# Patient Record
Sex: FEMALE | Race: WHITE | Employment: UNEMPLOYED | URBAN - METROPOLITAN AREA
[De-identification: names, ages, dates, MRNs, and addresses within clinical notes are randomized per-mention and may not be internally consistent; named-entity substitution may affect disease eponyms.]

---

## 2017-02-15 NOTE — PERIOP NOTES
[facility]  Ambulatory Surgery Unit  Pre-operative Instructions    Surgery/Procedure Date  2/17            Tentative Arrival Time TBD      1. On the day of your surgery/procedure, please report to the Ambulatory Surgery Unit Registration Desk and sign in at your designated time. The Ambulatory Surgery Unit is located in [facility] on the [facility] side of the [facility] across from the [address] [place] [address]. Please have all of your health insurance cards and a photo ID. 2. You must have someone with you to drive you home, as you should not drive a car for 24 hours following anesthesia. Please make arrangements for a responsible adult friend or family member to stay with you for at least the first 24 hours after your surgery. 3. Do not have anything to eat or drink (including water, gum, mints, coffee, juice) after midnight   2/16. This may not apply to medications prescribed by your physician. (Please note below the special instructions with medications to take the morning of surgery, if applicable.)    4. We recommend you do not drink any alcoholic beverages for 24 hours before and after your surgery. 5. Stop all Aspirin, non-steroidal anti-inflammatory drugs (i.e. Advil, Aleve), vitamins, and supplements as directed by your surgeon's office. **If you are currently taking Plavix, Coumadin, or other blood-thinning agents, contact your surgeon for instructions. **    6. In an effort to help prevent surgical site infection, we ask that you shower with an anti-bacterial soap (i.e. Dial or Safeguard) for 3 days prior to and on the morning of surgery, using a fresh towel after each shower. (Please begin this process with fresh bed linens.) Do not apply any lotions, powders, or deodorants after the shower on the day of your procedure. If applicable, please do not shave the operative site for 48 hours prior to surgery. 7. Wear comfortable clothes. Wear glasses instead of contacts.  Do not bring any jewelry or money (other than copays or fees as instructed). Do not wear make-up, particularly mascara, the morning of your surgery. Do not wear nail polish, particularly if you are having foot /hand surgery. Wear your hair loose or down, no ponytails, buns, tomeka pins or clips. All body piercings must be removed. 8. You should understand that if you do not follow these instructions your surgery may be cancelled. If your physical condition changes (i.e. fever, cold or flu) please contact your surgeon as soon as possible. 9. It is important that you be on time. If a situation occurs where you may be late, or if you have any questions or problems, please call [phone].    10. Your surgery time may be subject to change. You will receive a phone call the day prior to surgery to confirm your arrival time. 11. Pediatric patients: please bring a change of clothes, diapers, bottle/sippy cup, pacifier, etc.      Special Instructions:    MEDICATIONS TO TAKE THE MORNING OF SURGERY WITH A SIP OF WATER: prilosec (tramadol and flonase if needed)      I understand a pre-operative phone call will be made to verify my surgery time. In the event that I am not available, I give permission for a message to be left on my answering service and/or with another person? yes    Reviewed by phone with pt.   Verbalized understanding   ___________________      ___________________      ________________  (Signature of Patient)          (Witness)                   (Date and Time)

## 2017-02-16 ENCOUNTER — ANESTHESIA EVENT (OUTPATIENT)
Dept: SURGERY | Age: 47
End: 2017-02-16
Payer: MEDICAID

## 2017-02-17 ENCOUNTER — HOSPITAL ENCOUNTER (OUTPATIENT)
Age: 47
Setting detail: OUTPATIENT SURGERY
Discharge: HOME | End: 2017-02-17
Attending: OTOLARYNGOLOGY | Admitting: OTOLARYNGOLOGY
Payer: MEDICAID

## 2017-02-17 ENCOUNTER — ANESTHESIA (OUTPATIENT)
Dept: SURGERY | Age: 47
End: 2017-02-17
Payer: MEDICAID

## 2017-02-17 VITALS
SYSTOLIC BLOOD PRESSURE: 108 MMHG | DIASTOLIC BLOOD PRESSURE: 64 MMHG | HEART RATE: 78 BPM | HEIGHT: 61 IN | RESPIRATION RATE: 16 BRPM | OXYGEN SATURATION: 99 % | WEIGHT: 203 LBS | BODY MASS INDEX: 38.33 KG/M2 | TEMPERATURE: 97.7 F

## 2017-02-17 LAB — HCG UR QL: NEGATIVE

## 2017-02-17 PROCEDURE — 74011250637 HC RX REV CODE- 250/637: Performed by: ANESTHESIOLOGY

## 2017-02-17 PROCEDURE — 77030008656 HC TU EAR GRMMT MEDT -B: Performed by: OTOLARYNGOLOGY

## 2017-02-17 PROCEDURE — 77030018836 HC SOL IRR NACL ICUM -A: Performed by: OTOLARYNGOLOGY

## 2017-02-17 PROCEDURE — 74011250637 HC RX REV CODE- 250/637: Performed by: OTOLARYNGOLOGY

## 2017-02-17 PROCEDURE — 74011250636 HC RX REV CODE- 250/636: Performed by: ANESTHESIOLOGY

## 2017-02-17 PROCEDURE — 76210000046 HC AMBSU PH II REC FIRST 0.5 HR: Performed by: OTOLARYNGOLOGY

## 2017-02-17 PROCEDURE — 74011000250 HC RX REV CODE- 250

## 2017-02-17 PROCEDURE — 74011250636 HC RX REV CODE- 250/636

## 2017-02-17 PROCEDURE — 76210000040 HC AMBSU PH I REC FIRST 0.5 HR: Performed by: OTOLARYNGOLOGY

## 2017-02-17 PROCEDURE — 77030020255 HC SOL INJ LR 1000ML BG: Performed by: OTOLARYNGOLOGY

## 2017-02-17 PROCEDURE — 76030000002 HC AMB SURG OR TIME FIRST 0.: Performed by: OTOLARYNGOLOGY

## 2017-02-17 PROCEDURE — 81025 URINE PREGNANCY TEST: CPT

## 2017-02-17 PROCEDURE — 76060000073 HC AMB SURG ANES FIRST 0.5 HR: Performed by: OTOLARYNGOLOGY

## 2017-02-17 PROCEDURE — 77030006671 HC BLD MYRIN BVR BD -A: Performed by: OTOLARYNGOLOGY

## 2017-02-17 DEVICE — VENT TUBE 1028145 5PK SHEEHY SILICONE
Type: IMPLANTABLE DEVICE | Site: EAR | Status: FUNCTIONAL
Brand: SHEEHY

## 2017-02-17 RX ORDER — MORPHINE SULFATE 10 MG/ML
2 INJECTION, SOLUTION INTRAMUSCULAR; INTRAVENOUS
Status: DISCONTINUED | OUTPATIENT
Start: 2017-02-17 | End: 2017-02-17 | Stop reason: HOSPADM

## 2017-02-17 RX ORDER — FENTANYL CITRATE 50 UG/ML
INJECTION, SOLUTION INTRAMUSCULAR; INTRAVENOUS AS NEEDED
Status: DISCONTINUED | OUTPATIENT
Start: 2017-02-17 | End: 2017-02-17 | Stop reason: HOSPADM

## 2017-02-17 RX ORDER — ONDANSETRON 2 MG/ML
INJECTION INTRAMUSCULAR; INTRAVENOUS AS NEEDED
Status: DISCONTINUED | OUTPATIENT
Start: 2017-02-17 | End: 2017-02-17 | Stop reason: HOSPADM

## 2017-02-17 RX ORDER — FENTANYL CITRATE 50 UG/ML
25 INJECTION, SOLUTION INTRAMUSCULAR; INTRAVENOUS
Status: DISCONTINUED | OUTPATIENT
Start: 2017-02-17 | End: 2017-02-17 | Stop reason: HOSPADM

## 2017-02-17 RX ORDER — GLYCOPYRROLATE 0.2 MG/ML
INJECTION INTRAMUSCULAR; INTRAVENOUS AS NEEDED
Status: DISCONTINUED | OUTPATIENT
Start: 2017-02-17 | End: 2017-02-17 | Stop reason: HOSPADM

## 2017-02-17 RX ORDER — CIPROFLOXACIN AND DEXAMETHASONE 3; 1 MG/ML; MG/ML
SUSPENSION/ DROPS AURICULAR (OTIC) AS NEEDED
Status: DISCONTINUED | OUTPATIENT
Start: 2017-02-17 | End: 2017-02-17 | Stop reason: HOSPADM

## 2017-02-17 RX ORDER — SODIUM CHLORIDE 0.9 % (FLUSH) 0.9 %
5-10 SYRINGE (ML) INJECTION AS NEEDED
Status: DISCONTINUED | OUTPATIENT
Start: 2017-02-17 | End: 2017-02-17 | Stop reason: HOSPADM

## 2017-02-17 RX ORDER — OXYCODONE AND ACETAMINOPHEN 5; 325 MG/1; MG/1
1 TABLET ORAL ONCE
Status: DISCONTINUED | OUTPATIENT
Start: 2017-02-17 | End: 2017-02-17 | Stop reason: HOSPADM

## 2017-02-17 RX ORDER — DIPHENHYDRAMINE HYDROCHLORIDE 50 MG/ML
12.5 INJECTION, SOLUTION INTRAMUSCULAR; INTRAVENOUS AS NEEDED
Status: DISCONTINUED | OUTPATIENT
Start: 2017-02-17 | End: 2017-02-17 | Stop reason: HOSPADM

## 2017-02-17 RX ORDER — HYDROMORPHONE HYDROCHLORIDE 1 MG/ML
.2-.5 INJECTION, SOLUTION INTRAMUSCULAR; INTRAVENOUS; SUBCUTANEOUS ONCE
Status: DISCONTINUED | OUTPATIENT
Start: 2017-02-17 | End: 2017-02-17 | Stop reason: HOSPADM

## 2017-02-17 RX ORDER — HYDROCODONE BITARTRATE AND ACETAMINOPHEN 5; 325 MG/1; MG/1
TABLET ORAL
Status: DISCONTINUED
Start: 2017-02-17 | End: 2017-02-17 | Stop reason: HOSPADM

## 2017-02-17 RX ORDER — LIDOCAINE HYDROCHLORIDE 10 MG/ML
0.1 INJECTION, SOLUTION EPIDURAL; INFILTRATION; INTRACAUDAL; PERINEURAL AS NEEDED
Status: DISCONTINUED | OUTPATIENT
Start: 2017-02-17 | End: 2017-02-17 | Stop reason: HOSPADM

## 2017-02-17 RX ORDER — SODIUM CHLORIDE 0.9 % (FLUSH) 0.9 %
5-10 SYRINGE (ML) INJECTION EVERY 8 HOURS
Status: DISCONTINUED | OUTPATIENT
Start: 2017-02-17 | End: 2017-02-17 | Stop reason: HOSPADM

## 2017-02-17 RX ORDER — PROPOFOL 10 MG/ML
INJECTION, EMULSION INTRAVENOUS AS NEEDED
Status: DISCONTINUED | OUTPATIENT
Start: 2017-02-17 | End: 2017-02-17 | Stop reason: HOSPADM

## 2017-02-17 RX ORDER — HYDROCODONE BITARTRATE AND ACETAMINOPHEN 5; 325 MG/1; MG/1
1 TABLET ORAL
Status: COMPLETED | OUTPATIENT
Start: 2017-02-17 | End: 2017-02-17

## 2017-02-17 RX ORDER — LIDOCAINE HYDROCHLORIDE 20 MG/ML
INJECTION, SOLUTION EPIDURAL; INFILTRATION; INTRACAUDAL; PERINEURAL AS NEEDED
Status: DISCONTINUED | OUTPATIENT
Start: 2017-02-17 | End: 2017-02-17 | Stop reason: HOSPADM

## 2017-02-17 RX ORDER — SODIUM CHLORIDE, SODIUM LACTATE, POTASSIUM CHLORIDE, CALCIUM CHLORIDE 600; 310; 30; 20 MG/100ML; MG/100ML; MG/100ML; MG/100ML
25 INJECTION, SOLUTION INTRAVENOUS CONTINUOUS
Status: DISCONTINUED | OUTPATIENT
Start: 2017-02-17 | End: 2017-02-17 | Stop reason: HOSPADM

## 2017-02-17 RX ORDER — MIDAZOLAM HYDROCHLORIDE 1 MG/ML
INJECTION, SOLUTION INTRAMUSCULAR; INTRAVENOUS AS NEEDED
Status: DISCONTINUED | OUTPATIENT
Start: 2017-02-17 | End: 2017-02-17 | Stop reason: HOSPADM

## 2017-02-17 RX ADMIN — LIDOCAINE HYDROCHLORIDE 60 MG: 20 INJECTION, SOLUTION EPIDURAL; INFILTRATION; INTRACAUDAL; PERINEURAL at 12:27

## 2017-02-17 RX ADMIN — PROPOFOL 80 MG: 10 INJECTION, EMULSION INTRAVENOUS at 12:29

## 2017-02-17 RX ADMIN — FENTANYL CITRATE 100 MCG: 50 INJECTION, SOLUTION INTRAMUSCULAR; INTRAVENOUS at 12:22

## 2017-02-17 RX ADMIN — HYDROCODONE BITARTRATE AND ACETAMINOPHEN 1 TABLET: 5; 325 TABLET ORAL at 13:10

## 2017-02-17 RX ADMIN — SODIUM CHLORIDE, SODIUM LACTATE, POTASSIUM CHLORIDE, AND CALCIUM CHLORIDE 25 ML/HR: 600; 310; 30; 20 INJECTION, SOLUTION INTRAVENOUS at 11:12

## 2017-02-17 RX ADMIN — ONDANSETRON 4 MG: 2 INJECTION INTRAMUSCULAR; INTRAVENOUS at 12:22

## 2017-02-17 RX ADMIN — GLYCOPYRROLATE 0.1 MG: 0.2 INJECTION INTRAMUSCULAR; INTRAVENOUS at 12:27

## 2017-02-17 RX ADMIN — MIDAZOLAM HYDROCHLORIDE 2 MG: 1 INJECTION, SOLUTION INTRAMUSCULAR; INTRAVENOUS at 12:22

## 2017-02-17 RX ADMIN — PROPOFOL 20 MG: 10 INJECTION, EMULSION INTRAVENOUS at 12:27

## 2017-02-17 NOTE — ANESTHESIA PREPROCEDURE EVALUATION
Anesthetic History   No history of anesthetic complications            Review of Systems / Medical History  Patient summary reviewed, nursing notes reviewed and pertinent labs reviewed    Pulmonary        Sleep apnea: CPAP  Smoker         Neuro/Psych         Headaches     Cardiovascular  Within defined limits                Exercise tolerance: >4 METS     GI/Hepatic/Renal     GERD: well controlled           Endo/Other        Obesity     Other Findings   Comments: Fibromyalgia          Physical Exam    Airway  Mallampati: III  TM Distance: 4 - 6 cm  Neck ROM: normal range of motion   Mouth opening: Normal     Cardiovascular    Rhythm: regular  Rate: normal         Dental  No notable dental hx       Pulmonary  Breath sounds clear to auscultation               Abdominal  GI exam deferred       Other Findings            Anesthetic Plan    ASA: 3  Anesthesia type: general    Monitoring Plan: BIS      Induction: Intravenous  Anesthetic plan and risks discussed with: Patient

## 2017-02-17 NOTE — ANESTHESIA POSTPROCEDURE EVALUATION
Post-Anesthesia Evaluation and Assessment    Patient: Aleida Joy MRN: [id number]  SSN: [id number]    YOB: 1970  Age: 51 y.o. Sex: female       Cardiovascular Function/Vital Signs  Visit Vitals    /71    Pulse 70    Temp 36.4 °C (97.6 °F)    Resp 13    Ht 5' 1\" (1.549 m)    Wt 92.1 kg (203 lb)    SpO2 98%    BMI 38.36 kg/m2       Patient is status post general anesthesia for Procedure(s):  RIGHT MYRINGOTOMY WITH TUBES. Nausea/Vomiting: None    Postoperative hydration reviewed and adequate. Pain:  Pain Scale 1: Numeric (0 - 10) (02/17/17 1100)  Pain Intensity 1: 4 (02/17/17 1100)   Managed    Neurological Status:   Neuro (WDL): Within Defined Limits (02/17/17 1029)   At baseline    Mental Status and Level of Consciousness: Arousable    Pulmonary Status:   O2 Device: Room air (02/17/17 1246)   Adequate oxygenation and airway patent    Complications related to anesthesia: None    Post-anesthesia assessment completed.  No concerns    Signed By: Mitzi Bloch, MD     February 17, 2017

## 2017-02-17 NOTE — IP AVS SNAPSHOT
[address] [facility] 
[phone] Patient: Tayler Renner MRN: [id number] HTY:[birth year] You are allergic to the following Allergen Reactions Latex Rash Contact Dermatitis Sulfa (Sulfonamide Antibiotics) Anaphylaxis Betadine (Povidone-Iodine) Hives Rash Contact Dermatitis Codeine Hives Ibuprofen Hives Pt tolerates naproxen Recent Documentation Height Weight BMI OB Status Smoking Status 1.549 m 92.1 kg 38.36 kg/m2 Having regular periods Current Every Day Smoker Emergency Contacts Name Discharge Info Relation Home Work Mobile Rosario Saldivar DISCHARGE CAREGIVER [3] Father [15] About your hospitalization You were admitted on:  February 17, 2017 You last received care in the:  [facility] You were discharged on:  February 17, 2017 Unit phone number:  [phone] Why you were hospitalized Your primary diagnosis was:  Not on File Providers Seen During Your Hospitalizations Provider Role Specialty Primary office phone Winsome Franks MD Attending Provider Otolaryngology [phone] Your Primary Care Physician (PCP) Primary Care Physician Office Phone Office Fax Ana Laura Turk [phone] Follow-up Information Follow up With Details Comments Contact Info Pia Shields, [id number] [address] [address] [phone] Current Discharge Medication List  
  
CONTINUE these medications which have NOT CHANGED Dose & Instructions Dispensing Information Comments Morning Noon Evening Bedtime FLONASE 50 mcg/actuation nasal spray Generic drug:  fluticasone Your next dose is: Today, Tomorrow Other:  _________ Dose:  2 Spray 2 Sprays by Both Nostrils route daily as needed for Rhinitis. Refills:  0 omeprazole 20 mg capsule Commonly known as:  PRILOSEC Your next dose is: Today, Tomorrow Other:  _________ Dose:  20 mg Take 20 mg by mouth daily as needed. Indications: GASTROESOPHAGEAL REFLUX Refills:  0  
     
   
   
   
  
 traMADol 50 mg tablet Commonly known as:  ULTRAM  
   
Your next dose is: Today, Tomorrow Other:  _________ Dose:  50 mg Take 50 mg by mouth every six (6) hours as needed for Pain (migraine). Refills:  0 Discharge Instructions [facility]. Haig Hammans, M.D., Ph.D., F.A.C.S. Otolaryngology [address] 
[phone] INSTRUCTIONS CONCERNING EAR TUBES 
 
IN RECOVERY: 
Your child will feel dizzy and have blurred vision from anesthesia. Children respond to this dizzy feeling by crying and fighting  this is very normal.  The crying is usually from dizziness, not pain, but the nurses will medicate your child if needed. The crying usually lasts about 20 minutes but some children do not calm down until they leave the center. We bring the parents into the recovery room as soon as possible to help calm the child. Children having ear tubes can usually leave in 20 minutes from waking up in the recovery room. A big concern for children after surgery is their safety. Their heads need to be supported due to dizziness. Even children up to teenage years come into the recovery room with floppy heads. Toddlers may be very anxious to get down and walk  you must hold them or hold their hand if they insist on getting down. WHAT TO EXPECT AFTER DISCHARGE: 
1.  Dizziness from anesthesia is still a concern. Most children take a nap on the way home and feel less dizzy when they wake up. Please carry your child or hold their hand until you are sure they are no longer dizzy. Most children feel fine when they wake up and can return to school or day care the next day. 2. Liquids are allowed as soon as they wake up well in the recover. They can eat when they get home but nothing heavy or greasy for the first meal. 
3. There may be some blood in the ear or mucoid drainage for 2-3 days after surgery. Any continued drainage or temperature elevation may indicate infection in which case the office should be contacted. Change cotton balls as needed. 4. The ear tubes usually stay in place 6-12 months. The patient should be seen in the office every 6 months until the tube extrudes itself spontaneously. 5. Keep ear canals dry as long as ear tubes are in the ears! Use ear plugs or cotton balls coated with Vaseline when bathing. Extra protection should be used when swimming in lakes, rivers, or oceans. Use prescribed eardrops after swimming. No underwater swimming, jumping or diving. Macks ear plugs may be purchased at a drug store or our office can fit docsplugs for your convenience. Ear plugs are not needed for swimming in chlorinated pools. 6. Ciprodex ear drops will be given to you. Place 3 drops in each ear 3 times a day for 3 or 5 or 7 days. Keep the rest to use should further ear infections or drainage occur. 7. Please call my office at [phone] for an appointment for 3 weeks. Be sure to ask to have an appointment with the audiologist at the same time. 8. Tylenol or Motrin can be used for irritability or fever. 9. Flying is permitted after the tubes are in place. 8. Notify your doctor if you see drainage from the ear which is green, yellow, or has a foul odor. Call my office at [phone] if you have any questions. DO NOT TAKE TYLENOL/ACETAMINOPHEN WITH PERCOCET, LORTAB, [address] TAKE NARCOTIC PAIN MEDICATIONS WITH FOOD Narcotics tend to be constipating, we suggest taking a stool softener such as Colace or Miralax (follow package instructions). DO NOT DRIVE WHILE TAKING NARCOTIC PAIN MEDICATIONS. DO NOT TAKE SLEEPING MEDICATIONS OR ANTIANXIETY MEDICATIONS WHILE TAKING NARCOTIC PAIN MEDICATIONS,  ESPECIALLY THE NIGHT OF ANESTHESIA. CPAP PATIENTS BE SURE TO WEAR MACHINE WHENEVER NAPPING OR SLEEPING. DISCHARGE SUMMARY from Nurse The following personal items collected during your admission are returned to you:  
Dental Appliance: Dental Appliances:  (missing in back/) Vision: Visual Aid: Magnifying glass Hearing Aid:   
Jewelry: Jewelry: None Clothing: Clothing: Jacket/Coat, Shirt, Undergarments, With patient, Pants, Socks Other Valuables: Other Valuables: Cell Phone, Eyeglasses, Other (comment) (ID-family has them) Valuables sent to safe:   
 
 
PATIENT INSTRUCTIONS: 
 
 
F-face looks uneven A-arms unable to move or move even S-speech slurred or non-existent T-time-call 911 as soon as signs and symptoms begin-DO NOT go Back to bed or wait to see if you get better-TIME IS BRAIN. If you have not received your influenza and/or pneumococcal vaccine, please follow up with your primary care physician. The discharge information has been reviewed with the patient and caregiver. The patient and caregiver verbalized understanding. Discharge Orders None Introducing [facility]! Stewart Valdovinos introduces [employer] patient portal. Now you can access parts of your medical record, email your doctor's office, and request medication refills online. 1. In your internet browser, go to [email or web address] 2. Click on the First Time User? Click Here link in the Sign In box. You will see the New Member Sign Up page. 3. Enter your [employer] Access Code exactly as it appears below. You will not need to use this code after youve completed the sign-up process. If you do not sign up before the expiration date, you must request a new code. · [employer] Access Code: [email or web address] Expires: 4/27/2017  2:18 PM 
 
4. Enter the last four digits of your Social Security Number (xxxx) and Date of Birth (mm/dd/yyyy) as indicated and click Submit. You will be taken to the next sign-up page. 5. Create a [employer] ID. This will be your [employer] login ID and cannot be changed, so think of one that is secure and easy to remember. 6. Create a [employer] password. You can change your password at any time. 7. Enter your Password Reset Question and Answer.  This can be used at a later time if you forget your password. 8. Enter your e-mail address. You will receive e-mail notification when new information is available in [address] 9. Click Sign Up. You can now view and download portions of your medical record. 10. Click the Download Summary menu link to download a portable copy of your medical information. If you have questions, please visit the Frequently Asked Questions section of the [employer] website. Remember, [employer] is NOT to be used for urgent needs. For medical emergencies, dial 911. Now available from your iPhone and Android! General Information Please provide this summary of care documentation to your next provider. Patient Signature:  ____________________________________________________________ Date:  ____________________________________________________________  
  
Ashley Newcomer Provider Signature:  ____________________________________________________________ Date:  ____________________________________________________________

## 2017-02-17 NOTE — BRIEF OP NOTE
BRIEF OPERATIVE NOTE    Date of Procedure: 2/17/2017   Preoperative Diagnosis: CHRONIC OTITIS MEDIA   Postoperative Diagnosis: CHRONIC OTITIS MEDIA    Procedure(s):  RIGHT MYRINGOTOMY WITH TUBE  Surgeon(s) and Role:     * Angel Briscoe MD - Primary            Surgical Staff:  Circ-1: Ashwini Stewart RN  Scrub Tech-1: Estela aMckay  Event Time In   Incision Start 1230   Incision Close 1233     Anesthesia: General   Estimated Blood Loss: 0.2 ml  Specimens: * No specimens in log *   Findings: Mucoid RANDALL   Complications: none apparent  Implants:   Implant Name Type Inv.  Item Serial No.  Lot No. LRB No. Used Action   TUBE CLLR BTTN 1.27MM --  - [id number]   TUBE CLLR BTTN 1.27MM --    [employer] [id number] Right 1 Implanted

## 2017-02-17 NOTE — PERIOP NOTES
Less tearful. States good life but trying times. Ear starting to hurt more. States can take Hydrocodone/APAP. Given snack and Dr. Ellis Route.  [address]. Denies pain or chill. Discharge instructions reviewed with caregiver and patient. Allowed and answered questions. Tolerating PO fluids. Both state ready for discharge.

## 2017-02-17 NOTE — OP NOTES
[facility]   [address] [facility]   [place], [address]   OP NOTE       Name:  Hans Mendoza   MR#:  [id number]   :  1970   Account #:  [de-identified]    Surgery Date:  2017   Date of Adm:  2017       PREOPERATIVE DIAGNOSIS: Right chronic otitis media. POSTOPERATIVE DIAGNOSIS: Right chronic otitis media. PROCEDURES PERFORMED:  Right myringotomy and tubing. SURGEON: Sarah Thacker. Rasta Irving MD    ANESTHESIA:  GMA    INDICATIONS: The patient is a 48-year-old female with a long history   of persistent right middle ear fluid, related hearing loss and discomfort. The patient's findings and symptoms have been completely refractory   to medical therapy. Right myringotomy and tubing was discussed with   the patient after a fiberoptic nasal endoscopy showed no evidence of   nasopharyngeal cancer. Administration of local anesthesia as an   injection in the office was presented to the patient. However, she   refused this. At this time, she feels that she would be unable to tolerate   administration of local for the purpose of myringotomy and tubing and   requests general anesthesia. The patient has rather significant anxiety   disorder. Preoperatively, the alternatives, potential benefits and   possible risks of the procedure were explained to the patient, who   understood and requested to proceed. DESCRIPTION OF PROCEDURE: The patient was brought to the   operating room, placed supine on the operating table, and following the   smooth induction of general mask anesthesia, the right ear was   examined with the use of the operating microscope. An anterior-inferior   myringotomy allowed suctioning of mucoid middle ear fluid and   placement of a silicone collar-button ventilation tube without difficulty. After irrigation with Ciprodex ototopical drops, the patient's procedure   was concluded.      The patient was aroused from general anesthesia and transferred to   the recovery area in satisfactory condition. ESTIMATED BLOOD LOSS: 0.2 mL. COMPLICATIONS: None apparent. SPECIMENS: None.          Domenico Horner MD      [facility] / [facility]re   D:  02/17/2017   12:40   T:  02/17/2017   13:04   Job #:  [id number]

## 2017-02-17 NOTE — DISCHARGE INSTRUCTIONS
[facility], YANNI Jeronimo. Vanna Choudhary M.D., Ph.D., F.A.C.S. [address] [facility],[address]  [address], [address] [place] [address]  [phone]    INSTRUCTIONS CONCERNING EAR TUBES    IN RECOVERY:  Your child will feel dizzy and have blurred vision from anesthesia. Children respond to this dizzy feeling by crying and fighting - this is very normal.  The crying is usually from dizziness, not pain, but the nurses will medicate your child if needed. The crying usually lasts about 20 minutes but some children do not calm down until they leave the center. We bring the parents into the recovery room as soon as possible to help calm the child. Children having ear tubes can usually leave in 20 minutes from waking up in the recovery room. A big concern for children after surgery is their safety. Their heads need to be supported due to dizziness. Even children up to teenage years come into the recovery room with floppy heads. Toddlers may be very anxious to get down and walk - you must hold them or hold their hand if they insist on getting down. WHAT TO EXPECT AFTER DISCHARGE:  1.  Dizziness from anesthesia is still a concern. Most children take a nap on the way home and feel less dizzy when they wake up. Please carry your child or hold their hand until you are sure they are no longer dizzy. Most children feel fine when they wake up and can return to school or day care the next day. 2. Liquids are allowed as soon as they wake up well in the recover. They can eat when they get home but nothing heavy or greasy for the first meal.  3. There may be some blood in the ear or mucoid drainage for 2-3 days after surgery. Any continued drainage or temperature elevation may indicate infection in which case the office should be contacted. Change cotton balls as needed. 4. The ear tubes usually stay in place 6-12 months.   The patient should be seen in the office every 6 months until the tube extrudes itself spontaneously. 5. Keep ear canals dry as long as ear tubes are in the ears! Use ear plugs or cotton balls coated with Vaseline when bathing. Extra protection should be used when swimming in lakes, rivers, or oceans. Use prescribed eardrops after swimming. No underwater swimming, jumping or diving. Wolfgangs ear plugs may be purchased at a drug store or our office can fit docsplugs for your convenience. Ear plugs are not needed for swimming in chlorinated pools. 6. Ciprodex ear drops will be given to you. Place 3 drops in each ear 3 times a day for 3 or 5 or 7 days. Keep the rest to use should further ear infections or drainage occur. 7. Please call my office at [phone] for an appointment for 3 weeks. Be sure to ask to have an appointment with the audiologist at the same time. 8. Tylenol or Motrin can be used for irritability or fever. 9. Flying is permitted after the tubes are in place. 8. Notify your doctor if you see drainage from the ear which is green, yellow, or has a foul odor. Call my office at [phone] if you have any questions. DO NOT TAKE TYLENOL/ACETAMINOPHEN WITH PERCOCET, LORTAB, [address] TAKE NARCOTIC PAIN MEDICATIONS WITH FOOD   Narcotics tend to be constipating, we suggest taking a stool softener such as Colace or Miralax (follow package instructions). DO NOT DRIVE WHILE TAKING NARCOTIC PAIN MEDICATIONS. DO NOT TAKE SLEEPING MEDICATIONS OR ANTIANXIETY MEDICATIONS WHILE TAKING NARCOTIC PAIN MEDICATIONS,  ESPECIALLY THE NIGHT OF ANESTHESIA. CPAP PATIENTS BE SURE TO WEAR MACHINE WHENEVER NAPPING OR SLEEPING.     DISCHARGE SUMMARY from Nurse    The following personal items collected during your admission are returned to you:   Dental Appliance: Dental Appliances:  (missing in back/)  Vision: Visual Aid: Magnifying glass  Hearing Aid:    Jewelry: Jewelry: None  Clothing: Clothing: Jacket/Coat, Shirt, Undergarments, With patient, Pants, Socks  Other Valuables: Other Valuables: Cell Phone, Eyeglasses, Other (comment) (ID-family has them)  Valuables sent to safe:        PATIENT INSTRUCTIONS:    After General Anesthesia or Intravenous Sedation, for 24 hours or while taking prescription Narcotics:        Someone should be with you for the next 24 hours. For your own safety, a responsible adult must drive you home. · Limit your activities  · Recommended activity: Rest today, up with assistance today. Do not climb stairs or shower unattended for the next 24 hours. · Please start with a soft bland diet and advance as tolerated (no nausea) to regular diet. · If you have a sore throat you should try the following: fluids, warm salt water gargles, or throat lozenges. If it does not improve after several days please follow up with your primary physician. · Do not drive and operate hazardous machinery  · Do not make important personal or business decisions  · Do  not drink alcoholic beverages  · If you have not urinated within 8 hours after discharge, please contact your surgeon on call. Report the following to your surgeon:  · Excessive pain, swelling, redness or odor of or around the surgical area  · Temperature over 100.5  · Nausea and vomiting lasting longer than 4 hours or if unable to take medications  · Any signs of decreased circulation or nerve impairment to extremity: change in color, persistent  numbness, tingling, coldness or increase pain      · You will receive a Post Operative Call from one of the Recovery Room Nurses on the day after your surgery to check on you. It is very important for us to know how you are recovering after your surgery. If you have an issue or need to speak with someone, please call your surgeon, do not wait for the post operative call. · You may receive an e-mail or letter in the mail from [employer] regarding your experience with us in the Ambulatory Surgery Unit.  Your feedback is valuable to us and we appreciate your participation in the survey. · If the above instructions are not adequate, please contact Stepan Meng RN, [place] anesthesia Nurse Manager or our Anesthesiologist, at [phone]. If you are having problems after your surgery, call the physician at his office number. · We wish you a speedy recovery ? What to do at Home:      *  Please give a list of your current medications to your Primary Care Provider. *  Please update this list whenever your medications are discontinued, doses are      changed, or new medications (including over-the-counter products) are added. *  Please carry medication information at all times in case of emergency situations. These are general instructions for a healthy lifestyle:    No smoking/ No tobacco products/ Avoid exposure to second hand smoke    Surgeon General's Warning:  Quitting smoking now greatly reduces serious risk to your health. Obesity, smoking, and sedentary lifestyle greatly increases your risk for illness    A healthy diet, regular physical exercise & weight monitoring are important for maintaining a healthy lifestyle    You may be retaining fluid if you have a history of heart failure or if you experience any of the following symptoms:  Weight gain of 3 pounds or more overnight or 5 pounds in a week, increased swelling in our hands or feet or shortness of breath while lying flat in bed. Please call your doctor as soon as you notice any of these symptoms; do not wait until your next office visit. Recognize signs and symptoms of STROKE:    F-face looks uneven    A-arms unable to move or move even    S-speech slurred or non-existent    T-time-call 911 as soon as signs and symptoms begin-DO NOT go       Back to bed or wait to see if you get better-TIME IS BRAIN. If you have not received your influenza and/or pneumococcal vaccine, please follow up with your primary care physician.       The discharge information has been reviewed with the patient and caregiver. The patient and caregiver verbalized understanding.

## 2017-02-17 NOTE — PERIOP NOTES
Lori Foster  1970  [id number]    Situation:  Verbal report given from: Nakul Neal RN and  CRNA  Procedure: Procedure(s):  RIGHT MYRINGOTOMY WITH TUBES    Background:    Preoperative diagnosis: CHRONIC OTITIS MEDIA     Postoperative diagnosis: CHRONIC OTITIS MEDIA    :  Dr. Garlin Fothergill    Assistant(s): Circ-1: Cait Roblero RN  Scrub Tech-1: Dima Valdez    Specimens: * No specimens in log *    Assessment:  Intra-procedure medications         Anesthesia gave intra-procedure sedation and medications, see anesthesia flow sheet     Intravenous fluids: LR@ KVO     Vital signs stable.  Pt crying but states not pain but emotions      Recommendation:    Permission to share finding with family or friend yes

## 2020-07-29 RX ORDER — PAROXETINE HYDROCHLORIDE 20 MG/1
40 TABLET, FILM COATED ORAL DAILY
Status: ON HOLD | COMMUNITY
End: 2021-01-17 | Stop reason: SDUPTHER

## 2020-07-29 RX ORDER — CLONAZEPAM 0.5 MG/1
0.5 TABLET ORAL
COMMUNITY

## 2020-07-29 NOTE — PERIOP NOTES
[facility]  Ambulatory Surgery Unit  Pre-operative Instructions    Surgery/Procedure Date  8/4            Tentative Arrival Time tbd      1. On the day of your surgery/procedure, please report to the Ambulatory Surgery Unit Registration Desk and sign in at your designated time. The Ambulatory Surgery Unit is located in [facility] on the [facility] side of the [facility] across from the [address] [place] [address]. Please have all of your health insurance cards and a photo ID. 2. You must have someone with you to drive you home, as you should not drive a car for 24 hours following anesthesia. Please make arrangements for a responsible adult friend or family member to stay with you for at least the first 24 hours after your surgery. 3. Do not have anything to eat or drink (including water, gum, mints, coffee, juice) after 11:59 PM  8/3. This may not apply to medications prescribed by your physician. (Please note below the special instructions with medications to take the morning of surgery, if applicable.)    4. We recommend you do not drink any alcoholic beverages for 24 hours before and after your surgery. 5. Contact your surgeons office for instructions on the following medications: non-steroidal anti-inflammatory drugs (i.e. Advil, Aleve), vitamins, and supplements. (Some surgeons will want you to stop these medications prior to surgery and others may allow you to take them)   **If you are currently taking Plavix, Coumadin, Aspirin and/or other blood-thinning agents, contact your surgeon for instructions. ** Your surgeon will partner with the physician prescribing these medications to determine if it is safe to stop or if you need to continue taking. Please do not stop taking these medications without instructions from your surgeon.     6. In an effort to help prevent surgical site infection, we ask that you shower with an anti-bacterial soap (i.e. Dial/Safeguard, or the soap provided to you at your preadmission testing appointment) for 3 days prior to and on the morning of surgery, using a fresh towel after each shower. (Please begin this process with fresh bed linens.) Do not apply any lotions, powders, or deodorants after the shower on the day of your procedure. If applicable, please do not shave the operative site for 48 hours prior to surgery. 7. Wear comfortable clothes. Wear glasses instead of contacts. Do not bring any jewelry or money (other than copays or fees as instructed). Do not wear make-up, particularly mascara, the morning of your surgery. Do not wear nail polish, particularly if you are having foot /hand surgery. Wear your hair loose or down, no ponytails, buns, tomeka pins or clips. All body piercings must be removed. 8. You should understand that if you do not follow these instructions your surgery may be cancelled. If your physical condition changes (i.e. fever, cold or flu) please contact your surgeon as soon as possible. 9. It is important that you be on time. If a situation occurs where you may be late, or if you have any questions or problems, please call [phone].    10. Your surgery time may be subject to change. You will receive a phone call the day prior to surgery to confirm your arrival time. Special Instructions: Take all medications and inhalers, as prescribed, on the morning of surgery with a sip of water EXCEPT: none      I understand a pre-operative phone call will be made to verify my surgery time. In the event that I am not available, I give permission for a message to be left on my answering service and/or with another person? yes    Reviewed by phone with pt, verbalized understanding.   Pt aware of recommendation to self quarantine post covid     ___________________      ___________________      ________________  (Signature of Patient)          (Witness)                   (Date and Time)

## 2020-07-30 NOTE — PERIOP NOTES
Spoke with Liliam Galan about new order set in place that she faxed over. She will have Dr. Leelee Tejada corrected box on antibiotic side and refax.

## 2020-07-31 ENCOUNTER — HOSPITAL ENCOUNTER (OUTPATIENT)
Dept: PREADMISSION TESTING | Age: 50
Discharge: HOME | End: 2020-07-31
Payer: MEDICAID

## 2020-07-31 PROCEDURE — 87635 SARS-COV-2 COVID-19 AMP PRB: CPT

## 2020-08-01 LAB
SARS-COV-2, COV2NT: NOT DETECTED
SOURCE, COVRS: NORMAL
SPECIMEN SOURCE, FCOV2M: NORMAL

## 2020-08-03 ENCOUNTER — ANESTHESIA EVENT (OUTPATIENT)
Dept: SURGERY | Age: 50
End: 2020-08-03
Payer: MEDICAID

## 2020-08-04 ENCOUNTER — ANESTHESIA (OUTPATIENT)
Dept: SURGERY | Age: 50
End: 2020-08-04
Payer: MEDICAID

## 2020-08-04 ENCOUNTER — HOSPITAL ENCOUNTER (OUTPATIENT)
Age: 50
Setting detail: OUTPATIENT SURGERY
Discharge: HOME | End: 2020-08-04
Attending: OTOLARYNGOLOGY | Admitting: OTOLARYNGOLOGY
Payer: MEDICAID

## 2020-08-04 VITALS
SYSTOLIC BLOOD PRESSURE: 113 MMHG | TEMPERATURE: 97.9 F | WEIGHT: 220 LBS | HEIGHT: 61 IN | DIASTOLIC BLOOD PRESSURE: 71 MMHG | HEART RATE: 59 BPM | RESPIRATION RATE: 13 BRPM | OXYGEN SATURATION: 97 % | BODY MASS INDEX: 41.54 KG/M2

## 2020-08-04 LAB — HCG UR QL: NEGATIVE

## 2020-08-04 PROCEDURE — 76210000035 HC AMBSU PH I REC 1 TO 1.5 HR: Performed by: OTOLARYNGOLOGY

## 2020-08-04 PROCEDURE — 81025 URINE PREGNANCY TEST: CPT

## 2020-08-04 PROCEDURE — 74011000250 HC RX REV CODE- 250: Performed by: NURSE ANESTHETIST, CERTIFIED REGISTERED

## 2020-08-04 PROCEDURE — 77030011992 HC AIRWY NASOPHGL TELE -A: Performed by: ANESTHESIOLOGY

## 2020-08-04 PROCEDURE — 76210000046 HC AMBSU PH II REC FIRST 0.5 HR: Performed by: OTOLARYNGOLOGY

## 2020-08-04 PROCEDURE — 77030021352 HC CBL LD SYS DISP COVD -B: Performed by: OTOLARYNGOLOGY

## 2020-08-04 PROCEDURE — 74011250636 HC RX REV CODE- 250/636: Performed by: ANESTHESIOLOGY

## 2020-08-04 PROCEDURE — 77030018836 HC SOL IRR NACL ICUM -A: Performed by: OTOLARYNGOLOGY

## 2020-08-04 PROCEDURE — 74011250636 HC RX REV CODE- 250/636: Performed by: NURSE ANESTHETIST, CERTIFIED REGISTERED

## 2020-08-04 PROCEDURE — 76060000073 HC AMB SURG ANES FIRST 0.5 HR: Performed by: OTOLARYNGOLOGY

## 2020-08-04 PROCEDURE — 76030000002 HC AMB SURG OR TIME FIRST 0.: Performed by: OTOLARYNGOLOGY

## 2020-08-04 PROCEDURE — 77030008656 HC TU EAR GRMMT MEDT -B: Performed by: OTOLARYNGOLOGY

## 2020-08-04 PROCEDURE — 74011250637 HC RX REV CODE- 250/637: Performed by: OTOLARYNGOLOGY

## 2020-08-04 PROCEDURE — 74011250636 HC RX REV CODE- 250/636

## 2020-08-04 PROCEDURE — 77030006671 HC BLD MYRIN BVR BD -A: Performed by: OTOLARYNGOLOGY

## 2020-08-04 DEVICE — VENT TUBE 1028145 5PK SHEEHY SILICONE
Type: IMPLANTABLE DEVICE | Site: EAR | Status: FUNCTIONAL
Brand: SHEEHY

## 2020-08-04 RX ORDER — SODIUM CHLORIDE, SODIUM LACTATE, POTASSIUM CHLORIDE, CALCIUM CHLORIDE 600; 310; 30; 20 MG/100ML; MG/100ML; MG/100ML; MG/100ML
25 INJECTION, SOLUTION INTRAVENOUS CONTINUOUS
Status: DISCONTINUED | OUTPATIENT
Start: 2020-08-04 | End: 2020-08-04 | Stop reason: HOSPADM

## 2020-08-04 RX ORDER — FENTANYL CITRATE 50 UG/ML
INJECTION, SOLUTION INTRAMUSCULAR; INTRAVENOUS AS NEEDED
Status: DISCONTINUED | OUTPATIENT
Start: 2020-08-04 | End: 2020-08-04 | Stop reason: HOSPADM

## 2020-08-04 RX ORDER — CIPROFLOXACIN AND FLUOCINOLONE ACETONIDE .75; .0625 MG/.25ML; MG/.25ML
SOLUTION AURICULAR (OTIC) AS NEEDED
Status: DISCONTINUED | OUTPATIENT
Start: 2020-08-04 | End: 2020-08-04 | Stop reason: HOSPADM

## 2020-08-04 RX ORDER — ONDANSETRON 2 MG/ML
INJECTION INTRAMUSCULAR; INTRAVENOUS
Status: DISCONTINUED
Start: 2020-08-04 | End: 2020-08-04 | Stop reason: HOSPADM

## 2020-08-04 RX ORDER — CAFFEINE CITRATE 20 MG/ML
SOLUTION INTRAVENOUS
Status: COMPLETED
Start: 2020-08-04 | End: 2020-08-04

## 2020-08-04 RX ORDER — SODIUM CHLORIDE 0.9 % (FLUSH) 0.9 %
5-40 SYRINGE (ML) INJECTION EVERY 8 HOURS
Status: DISCONTINUED | OUTPATIENT
Start: 2020-08-04 | End: 2020-08-04 | Stop reason: HOSPADM

## 2020-08-04 RX ORDER — PROPOFOL 10 MG/ML
INJECTION, EMULSION INTRAVENOUS AS NEEDED
Status: DISCONTINUED | OUTPATIENT
Start: 2020-08-04 | End: 2020-08-04 | Stop reason: HOSPADM

## 2020-08-04 RX ORDER — SODIUM CHLORIDE 0.9 % (FLUSH) 0.9 %
5-40 SYRINGE (ML) INJECTION AS NEEDED
Status: DISCONTINUED | OUTPATIENT
Start: 2020-08-04 | End: 2020-08-04 | Stop reason: HOSPADM

## 2020-08-04 RX ORDER — PROMETHAZINE HYDROCHLORIDE 12.5 MG/1
12.5 TABLET ORAL
COMMUNITY
End: 2021-01-18

## 2020-08-04 RX ORDER — SODIUM CHLORIDE 900 MG/100ML
INJECTION INTRAVENOUS
Status: DISCONTINUED
Start: 2020-08-04 | End: 2020-08-04 | Stop reason: HOSPADM

## 2020-08-04 RX ORDER — DEXAMETHASONE SODIUM PHOSPHATE 4 MG/ML
INJECTION, SOLUTION INTRA-ARTICULAR; INTRALESIONAL; INTRAMUSCULAR; INTRAVENOUS; SOFT TISSUE AS NEEDED
Status: DISCONTINUED | OUTPATIENT
Start: 2020-08-04 | End: 2020-08-04 | Stop reason: HOSPADM

## 2020-08-04 RX ORDER — CAFFEINE CITRATE 20 MG/ML
60 SOLUTION INTRAVENOUS ONCE
Status: COMPLETED | OUTPATIENT
Start: 2020-08-04 | End: 2020-08-04

## 2020-08-04 RX ORDER — LIDOCAINE HYDROCHLORIDE 20 MG/ML
INJECTION, SOLUTION EPIDURAL; INFILTRATION; INTRACAUDAL; PERINEURAL AS NEEDED
Status: DISCONTINUED | OUTPATIENT
Start: 2020-08-04 | End: 2020-08-04 | Stop reason: HOSPADM

## 2020-08-04 RX ORDER — MORPHINE SULFATE 10 MG/ML
2 INJECTION, SOLUTION INTRAMUSCULAR; INTRAVENOUS
Status: DISCONTINUED | OUTPATIENT
Start: 2020-08-04 | End: 2020-08-04 | Stop reason: HOSPADM

## 2020-08-04 RX ORDER — ONDANSETRON 2 MG/ML
4 INJECTION INTRAMUSCULAR; INTRAVENOUS ONCE
Status: COMPLETED | OUTPATIENT
Start: 2020-08-04 | End: 2020-08-04

## 2020-08-04 RX ORDER — FENTANYL CITRATE 50 UG/ML
25 INJECTION, SOLUTION INTRAMUSCULAR; INTRAVENOUS
Status: COMPLETED | OUTPATIENT
Start: 2020-08-04 | End: 2020-08-04

## 2020-08-04 RX ORDER — OXYCODONE AND ACETAMINOPHEN 5; 325 MG/1; MG/1
1 TABLET ORAL
Status: DISCONTINUED | OUTPATIENT
Start: 2020-08-04 | End: 2020-08-04 | Stop reason: HOSPADM

## 2020-08-04 RX ORDER — KETAMINE HYDROCHLORIDE 100 MG/ML
INJECTION, SOLUTION INTRAMUSCULAR; INTRAVENOUS AS NEEDED
Status: DISCONTINUED | OUTPATIENT
Start: 2020-08-04 | End: 2020-08-04 | Stop reason: HOSPADM

## 2020-08-04 RX ORDER — LIDOCAINE HYDROCHLORIDE 10 MG/ML
0.1 INJECTION, SOLUTION EPIDURAL; INFILTRATION; INTRACAUDAL; PERINEURAL AS NEEDED
Status: DISCONTINUED | OUTPATIENT
Start: 2020-08-04 | End: 2020-08-04 | Stop reason: HOSPADM

## 2020-08-04 RX ORDER — FENTANYL CITRATE 50 UG/ML
INJECTION, SOLUTION INTRAMUSCULAR; INTRAVENOUS
Status: COMPLETED
Start: 2020-08-04 | End: 2020-08-04

## 2020-08-04 RX ORDER — CIPROFLOXACIN HYDROCHLORIDE 3.5 MG/ML
3 SOLUTION/ DROPS TOPICAL 3 TIMES DAILY
Qty: 5 ML | Refills: 0 | Status: SHIPPED | OUTPATIENT
Start: 2020-08-04 | End: 2020-08-07

## 2020-08-04 RX ORDER — ONDANSETRON 2 MG/ML
INJECTION INTRAMUSCULAR; INTRAVENOUS AS NEEDED
Status: DISCONTINUED | OUTPATIENT
Start: 2020-08-04 | End: 2020-08-04 | Stop reason: HOSPADM

## 2020-08-04 RX ORDER — DIPHENHYDRAMINE HYDROCHLORIDE 50 MG/ML
12.5 INJECTION, SOLUTION INTRAMUSCULAR; INTRAVENOUS AS NEEDED
Status: DISCONTINUED | OUTPATIENT
Start: 2020-08-04 | End: 2020-08-04 | Stop reason: HOSPADM

## 2020-08-04 RX ORDER — MIDAZOLAM HYDROCHLORIDE 1 MG/ML
INJECTION, SOLUTION INTRAMUSCULAR; INTRAVENOUS AS NEEDED
Status: DISCONTINUED | OUTPATIENT
Start: 2020-08-04 | End: 2020-08-04 | Stop reason: HOSPADM

## 2020-08-04 RX ORDER — HYDROMORPHONE HYDROCHLORIDE 1 MG/ML
.2-.5 INJECTION, SOLUTION INTRAMUSCULAR; INTRAVENOUS; SUBCUTANEOUS ONCE
Status: DISCONTINUED | OUTPATIENT
Start: 2020-08-04 | End: 2020-08-04 | Stop reason: HOSPADM

## 2020-08-04 RX ADMIN — ONDANSETRON 4 MG: 2 INJECTION INTRAMUSCULAR; INTRAVENOUS at 07:14

## 2020-08-04 RX ADMIN — MEPERIDINE HYDROCHLORIDE 12.5 MG: 25 INJECTION INTRAMUSCULAR; INTRAVENOUS; SUBCUTANEOUS at 08:44

## 2020-08-04 RX ADMIN — DEXAMETHASONE SODIUM PHOSPHATE 4 MG: 4 INJECTION, SOLUTION INTRAMUSCULAR; INTRAVENOUS at 07:44

## 2020-08-04 RX ADMIN — MEPERIDINE HYDROCHLORIDE 12.5 MG: 25 INJECTION INTRAMUSCULAR; INTRAVENOUS; SUBCUTANEOUS at 08:13

## 2020-08-04 RX ADMIN — SODIUM CHLORIDE, SODIUM LACTATE, POTASSIUM CHLORIDE, AND CALCIUM CHLORIDE 25 ML/HR: 600; 310; 30; 20 INJECTION, SOLUTION INTRAVENOUS at 07:14

## 2020-08-04 RX ADMIN — FENTANYL CITRATE 25 MCG: 50 INJECTION, SOLUTION INTRAMUSCULAR; INTRAVENOUS at 08:07

## 2020-08-04 RX ADMIN — MIDAZOLAM HYDROCHLORIDE 2 MG: 1 INJECTION, SOLUTION INTRAMUSCULAR; INTRAVENOUS at 07:31

## 2020-08-04 RX ADMIN — KETAMINE HYDROCHLORIDE 25 MG: 100 INJECTION, SOLUTION, CONCENTRATE INTRAMUSCULAR; INTRAVENOUS at 07:37

## 2020-08-04 RX ADMIN — PROPOFOL 50 MG: 10 INJECTION, EMULSION INTRAVENOUS at 07:39

## 2020-08-04 RX ADMIN — FENTANYL CITRATE 25 MCG: 50 INJECTION, SOLUTION INTRAMUSCULAR; INTRAVENOUS at 08:50

## 2020-08-04 RX ADMIN — PROPOFOL 50 MG: 10 INJECTION, EMULSION INTRAVENOUS at 07:34

## 2020-08-04 RX ADMIN — PROPOFOL 50 MG: 10 INJECTION, EMULSION INTRAVENOUS at 07:31

## 2020-08-04 RX ADMIN — PROPOFOL 50 MG: 10 INJECTION, EMULSION INTRAVENOUS at 07:35

## 2020-08-04 RX ADMIN — FENTANYL CITRATE 25 MCG: 50 INJECTION, SOLUTION INTRAMUSCULAR; INTRAVENOUS at 08:23

## 2020-08-04 RX ADMIN — ONDANSETRON HYDROCHLORIDE 4 MG: 2 INJECTION, SOLUTION INTRAMUSCULAR; INTRAVENOUS at 07:44

## 2020-08-04 RX ADMIN — SODIUM CHLORIDE, POTASSIUM CHLORIDE, SODIUM LACTATE AND CALCIUM CHLORIDE: 600; 310; 30; 20 INJECTION, SOLUTION INTRAVENOUS at 07:06

## 2020-08-04 RX ADMIN — FENTANYL CITRATE 50 MCG: 50 INJECTION, SOLUTION INTRAMUSCULAR; INTRAVENOUS at 07:31

## 2020-08-04 RX ADMIN — FENTANYL CITRATE 25 MCG: 50 INJECTION, SOLUTION INTRAMUSCULAR; INTRAVENOUS at 08:29

## 2020-08-04 RX ADMIN — CAFFEINE CITRATE 60 MG: 20 INJECTION, SOLUTION INTRAVENOUS at 07:16

## 2020-08-04 RX ADMIN — FENTANYL CITRATE 50 MCG: 50 INJECTION, SOLUTION INTRAMUSCULAR; INTRAVENOUS at 07:35

## 2020-08-04 RX ADMIN — LIDOCAINE HYDROCHLORIDE 40 MG: 20 INJECTION, SOLUTION EPIDURAL; INFILTRATION; INTRACAUDAL; PERINEURAL at 07:31

## 2020-08-04 RX ADMIN — PROPOFOL 50 MG: 10 INJECTION, EMULSION INTRAVENOUS at 07:37

## 2020-08-04 RX ADMIN — CAFFEINE CITRATE 60 MG: 20 SOLUTION INTRAVENOUS at 07:16

## 2020-08-04 NOTE — PERIOP NOTES
Permission received to review discharge instructions and discuss private health information with Ximena sinha. Patient states that bharath will be with them for at least 24 hours following today's procedure. Mistral-Air warming blanket applied at this time. Set to appropriate setting that is comfortable to patient. Will continue to monitor.

## 2020-08-04 NOTE — ANESTHESIA PREPROCEDURE EVALUATION
Anesthetic History   No history of anesthetic complications            Review of Systems / Medical History  Patient summary reviewed, nursing notes reviewed and pertinent labs reviewed    Pulmonary        Sleep apnea: CPAP  Smoker (1 ppd)      Comments: chronic OM   Neuro/Psych         Headaches     Cardiovascular  Within defined limits                Exercise tolerance: >4 METS     GI/Hepatic/Renal     GERD: well controlled           Endo/Other        Obesity     Other Findings   Comments: Fibromyalgia   Nausea & headache this am         Physical Exam    Airway  Mallampati: II  TM Distance: 4 - 6 cm  Neck ROM: normal range of motion   Mouth opening: Normal     Cardiovascular    Rhythm: regular  Rate: normal         Dental  No notable dental hx       Pulmonary  Breath sounds clear to auscultation               Abdominal  GI exam deferred       Other Findings            Anesthetic Plan    ASA: 3  Anesthesia type: general          Induction: Intravenous  Anesthetic plan and risks discussed with: Patient      zofran & IV caffeine preop
01-Mar-2018

## 2020-08-04 NOTE — PERIOP NOTES
0753 Pt received in PACU eyes closed mumbling softlyunable to understand    0801  Pt crying c/o severe headache, CRNA states pt has had headache for one week. Treated with caffeine and antiemetics preop. 2228 Pt continues to cry. C/o severe migraine. Pain 10/10 Medicate with fentanyl 25 mcq.    0813  Pt denies any relief, Continues to cry with c/o severe migraine 10/10 medicate with demerol 12.5mg IV    0823 Pt no longer sobbing states migraine a little better 8/10 medicate with fentanyl 25 mcq IV.    0829 Pt states she has to pee but in too much pain to go to bathroom. Given bedpan, voided moderate amount. Migraine 7/10 medicate with fentanyl 25 mcq. States if pain got to 5 it would be tolerable    0837 Much calmer, no longer crying, talking states right ear hurts 6/10.    0844 States headache 6/10, right ear 5/10   Medicate with demerol 12.5mg    0850  Pain the same, Pt continues to be alert and talking, medicate with fentanyl 25mcg IV.    0905 Awake and alert migraine pain tolerable 5/10 and right ear 4/10. Drinking apple juice, denies nausea.     0919  To bathroom to void

## 2020-08-04 NOTE — OP NOTES
[address]  OPERATIVE REPORT    Name:  Jermaine Johnson  MR#:  [id number]  :  1970  ACCOUNT #:  [de-identified]  DATE OF SERVICE:  2020    PREOPERATIVE DIAGNOSIS:  Chronic otitis media. POSTOPERATIVE DIAGNOSIS:  Chronic otitis media. PROCEDURE PERFORMED:  Bilateral myringotomy and tubing. SURGEON:  Monica Moore MD    ASSISTANT:  None    ANESTHESIA:  Sedation. COMPLICATIONS:  None apparent. SPECIMENS REMOVED:  None. IMPLANTS:  Temporary tympanostomy tubes. ESTIMATED BLOOD LOSS:  0.2 mL. INDICATIONS:  The patient is a 58-year-old female with a long history of chronic middle ear effusion and related hearing loss and ear discomfort. As her symptoms have been refractory to medical therapy, bilateral myringotomy and tubing were discussed. The patient has undergone previous ventilation tube placement with good results. However upon extrusion of the most recent set of ventilation tubes, recurrence of her otitis difficulties has been present. Preoperatively, the alternatives, potential benefits, and possible risks of the procedure were explained to the patient, who understood and requested to proceed. PROCEDURE:  The patient was brought to the operating room and placed supine on the operating table and following the smooth induction of generous sedation, the patient's right ear was examined with the use of the operating microscope. An anterior-inferior myringotomy allowed suctioning of mucoid middle ear fluid and placement of a silicone collar button ventilation tube without difficulty. After irrigation with ototopical drops, attention was turned to the contralateral ear. In a nearly identical fashion, an anterior-inferior myringotomy allowed suctioning of serous middle ear fluid and placement of a silicone collar button ventilation tube without difficulty. After irrigation with ototopical drops, the patient's procedure was concluded.   The patient was gradually aroused from sedation and transferred to the recovery area in satisfactory condition.         Claus Gusman MD      [id number]  D:  08/04/2020 8:06  T:  08/04/2020 13:42  JOB #:  [id number]  CC:  Ruby Zaidi MD

## 2020-08-04 NOTE — PERIOP NOTES
Araceli Beverly  1970  [id number]    Situation:  Verbal report given from: BRISA Chi RN  Procedure: Procedure(s):  BILATERAL MYRINGOTOMY WITH TUBES (LATEX ALLERGY)    Background:    Preoperative diagnosis: CHRONIC OTITIS MEDIA    Postoperative diagnosis: CHRONIC OTITIS MEDIA    :  Dr. Mariposa Hernandez    Assistant(s): Circ-1: Shannan Gasca RN  Scrub Tech-1: [facility]  Surg Asst-1: Shelby Barthel    Specimens: * No specimens in log *    Assessment:  Intra-procedure medications         Anesthesia gave intra-procedure sedation and medications, see anesthesia flow sheet     Intravenous fluids: LR@ KVO     Vital signs stable       Recommendation:    Permission to share finding with bharath Bueno

## 2020-08-04 NOTE — DISCHARGE INSTRUCTIONS
[facility], YANNI Charles. Anastacia Alcantraa M.D., Ph.D., F.A.C.S. [facility], [address] [place] [address]  [phone]    INSTRUCTIONS CONCERNING EAR TUBES      WHAT TO EXPECT AFTER DISCHARGE:  1.  Dizziness from anesthesia is still a concern. 2. Liquids are allowed as soon as they wake up well in the recovery. You can eat when you get home but nothing heavy or greasy for the first meal.  3. There may be some blood in the ear or mucoid drainage for 2-3 days after surgery. Any continued drainage or temperature elevation may indicate infection in which case the office should be contacted. Change cotton balls as needed. 4. The ear tubes usually stay in place 12-18 months. The patient should be seen in the office every 6 months until the tube extrudes itself spontaneously. 5. Keep ear canals dry as long as ear tubes are in the ears! Use ear plugs or cotton balls coated with Vaseline when bathing. Extra protection should be used when swimming in lakes, rivers, or oceans. Use prescribed eardrops after swimming. No underwater swimming, jumping or diving. Desk[employer] ear plugs may be purchased at a drug store or our office can fit docsplugs for your convenience. Ear plugs are not needed for swimming in chlorinated pools. 6. Ciprodex ear drops will be prescribed for you. Place 3 drops in each ear 3 times a day for 3 days. Keep the rest to use should further ear infections or drainage occur. 7. Please call my office at [phone] for an appointment for 3 weeks. Be sure to ask to have an appointment with the audiologist at the same time. 8. Tylenol or Motrin can be used for discomfort or fever. 9. Flying is permitted after the tubes are in place. 8. Notify your doctor if you see drainage from the ear which is green, yellow, or has a foul odor. Call my office at [phone] if you have any questions.     DO NOT TAKE SLEEPING MEDICATIONS OR ANTIANXIETY MEDICATIONS WHILE TAKING NARCOTIC PAIN MEDICATIONS,  ESPECIALLY THE NIGHT OF ANESTHESIA! CPAP PATIENTS BE SURE TO WEAR MACHINE WHENEVER NAPPING OR SLEEPING! DISCHARGE SUMMARY from Nurse    The following personal items collected during your admission are returned to you:   Dental Appliance: Dental Appliances: None  Vision: Visual Aid: Glasses(Given to bharath)  Hearing Aid:    Jewelry: Jewelry: None  Clothing: Clothing: With patient  Other Valuables: Other Valuables: Eyeglasses(in PACU)  Valuables sent to safe:        PATIENT INSTRUCTIONS:    After General Anesthesia or Intravenous Sedation, for 24 hours or while taking prescription Narcotics:        Someone should be with you for the next 24 hours. For your own safety, a responsible adult must drive you home. · Limit your activities  · Recommended activity: Rest today, up with assistance today. Do not climb stairs or shower unattended for the next 24 hours. · Please start with a soft bland diet and advance as tolerated (no nausea) to regular diet. · If you have a sore throat you should try the following: fluids, warm salt water gargles, or throat lozenges. If it does not improve after several days please follow up with your primary physician. · Do not drive and operate hazardous machinery  · Do not make important personal or business decisions  · Do  not drink alcoholic beverages  · If you have not urinated within 8 hours after discharge, please contact your surgeon on call.     Report the following to your surgeon:  · Excessive pain, swelling, redness or odor of or around the surgical area  · Temperature over 100.5  · Nausea and vomiting lasting longer than 4 hours or if unable to take medications  · Any signs of decreased circulation or nerve impairment to extremity: change in color, persistent  numbness, tingling, coldness or increase pain      · You will receive a Post Operative Call from one of the Recovery Room Nurses on the day after your surgery to check on you. It is very important for us to know how you are recovering after your surgery. If you have an issue or need to speak with someone, please call your surgeon, do not wait for the post operative call. · You may receive an e-mail or letter in the mail from [facility] regarding your experience with us in the Ambulatory Surgery Unit. Your feedback is valuable to us and we appreciate your participation in the survey. · If the above instructions are not adequate or you are having problems after your surgery, call the physician at their office number. · We wish you a speedy recovery ? What to do at Home:      *  Please give a list of your current medications to your Primary Care Provider. *  Please update this list whenever your medications are discontinued, doses are      changed, or new medications (including over-the-counter products) are added. *  Please carry medication information at all times in case of emergency situations. If you have not received your influenza and/or pneumococcal vaccine, please follow up with your primary care physician. The discharge information has been reviewed with the patient and caregiver. The patient and caregiver verbalized understanding. Learning About Coronavirus [phone])  Coronavirus [phone]): Overview  What is coronavirus (COVID-19)? The coronavirus disease (COVID-19) is caused by a virus. It is an illness that was first found in [place], [place], in December 2019. It has since spread worldwide. The virus can cause fever, cough, and trouble breathing. In severe cases, it can cause pneumonia and make it hard to breathe without help. It can cause death. Coronaviruses are a large group of viruses. They cause the common cold. They also cause more serious illnesses like Middle East respiratory syndrome (MERS) and severe acute respiratory syndrome (SARS). COVID-19 is caused by a novel coronavirus. That means it's a new type that has not been seen in people before. This virus spreads person-to-person through droplets from coughing and sneezing. It can also spread when you are close to someone who is infected. And it can spread when you touch something that has the virus on it, such as a doorknob or a tabletop. What can you do to protect yourself from coronavirus (COVID-19)? The best way to protect yourself from getting sick is to:  · Avoid areas where there is an outbreak. · Avoid contact with people who may be infected. · Wash your hands often with soap or alcohol-based hand sanitizers. · Avoid crowds and try to stay at least 6 feet away from other people. · Wash your hands often, especially after you cough or sneeze. Use soap and water, and scrub for at least 20 seconds. If soap and water aren't available, use an alcohol-based hand . · Avoid touching your mouth, nose, and eyes. What can you do to avoid spreading the virus to others? To help avoid spreading the virus to others:  · Cover your mouth with a tissue when you cough or sneeze. Then throw the tissue in the trash. · Use a disinfectant to clean things that you touch often. · Wear a cloth face cover if you have to go to public areas. · Stay home if you are sick or have been exposed to the virus. Don't go to school, work, or public areas. And don't use public transportation, ride-shares, or taxis unless you have no choice. · If you are sick:  ? Leave your home only if you need to get medical care. But call the doctor's office first so they know you're coming. And wear a face cover. ? Wear the face cover whenever you're around other people. It can help stop the spread of the virus when you cough or sneeze. ? Clean and disinfect your home every day. Use household  and disinfectant wipes or sprays. Take special care to clean things that you grab with your hands.  These include doorknobs, remote controls, phones, and handles on your refrigerator and microwave. And don't forget countertops, tabletops, bathrooms, and computer keyboards. When to call for help  [id number] anytime you think you may need emergency care. For example, call if:  · You have severe trouble breathing. (You can't talk at all.)  · You have constant chest pain or pressure. · You are severely dizzy or lightheaded. · You are confused or can't think clearly. · Your face and lips have a blue color. · You pass out (lose consciousness) or are very hard to wake up. Call your doctor now if you develop symptoms such as:  · Shortness of breath. · Fever. · Cough. If you need to get care, call ahead to the doctor's office for instructions before you go. Make sure you wear a face cover to prevent exposing other people to the virus. Where can you get the latest information? The following health organizations are tracking and studying this virus. Their websites contain the most up-to-date information. Ileana Davidadrienofelia also learn what to do if you think you may have been exposed to the virus. · U.S. Centers for Disease Control and Prevention (CDC): The CDC provides updated news about the disease and travel advice. The website also tells you how to prevent the spread of infection. www.cdc.gov  · World Health Organization [facility]): WHO offers information about the virus outbreaks. WHO also has travel advice. www.who.int  Current as of: May 8, 2020               Content Version: 12.5  © 2006-2020 Healthwise, Incorporated. Care instructions adapted under license by [employer] (which disclaims liability or warranty for this information). If you have questions about a medical condition or this instruction, always ask your healthcare professional. [address] any warranty or liability for your use of this information.

## 2020-08-04 NOTE — ANESTHESIA POSTPROCEDURE EVALUATION
Procedure(s):  BILATERAL MYRINGOTOMY WITH TUBES (LATEX ALLERGY). general    Anesthesia Post Evaluation      Multimodal analgesia: multimodal analgesia used between 6 hours prior to anesthesia start to PACU discharge  Patient location during evaluation: PACU  Patient participation: complete - patient participated  Level of consciousness: awake  Pain score: 4  Airway patency: patent  Anesthetic complications: no  Cardiovascular status: acceptable  Respiratory status: acceptable  Hydration status: acceptable  Comments: Pt has chronic HA pain  Post anesthesia nausea and vomiting:  none  Final Post Anesthesia Temperature Assessment:  Normothermia (36.0-37.5 degrees C)      INITIAL Post-op Vital signs:   Vitals Value Taken Time   /71 8/4/2020  9:01 AM   Temp     Pulse 59 8/4/2020  9:05 AM   Resp 16 8/4/2020  9:05 AM   SpO2 95 % 8/4/2020  9:05 AM   Vitals shown include unvalidated device data.

## 2020-08-04 NOTE — BRIEF OP NOTE
Brief Postoperative Note    Patient: Carlie Rodriguez  YOB: 1970  MRN: [id number]    Date of Procedure: 8/4/2020     Pre-Op Diagnosis: CHRONIC OTITIS MEDIA    Post-Op Diagnosis: Same. Procedure(s):  BILATERAL MYRINGOTOMY WITH TUBES (LATEX ALLERGY)    Surgeon(s):  Anders Reddy MD    Surgical Assistant: None    Anesthesia: General     Estimated Blood Loss (mL): 0.2    Complications: None apparent    Specimens: * No specimens in log *     Implants:   Implant Name Type Inv. Item Serial No.  Lot No. LRB No. Used Action   TUBE CLLR BTTN 1.27MM --  - SN/A  TUBE CLLR BTTN 1.27MM --  N/A [employer] [id number]  1 Implanted   TUBE CLLR BTTN 1.27MM --  - SN/A  TUBE CLLR BTTN 1.27MM --  N/A [employer] [id number] Left 1 Implanted       Drains: * No LDAs found *    Findings: Bilateral effusions.     Electronically Signed by Ninfa Hines MD on 8/4/2020 at 7:48 AM

## 2020-08-07 LAB
COVID-19 RAPID TEST, COVR: NORMAL
COVID-19, XGCOVT: NORMAL
SARS-COV-2, COV2: NORMAL
SARS-COV-2, COV2NT: NORMAL
SOURCE, COVRS: NORMAL
SPECIMEN SOURCE, FCOV2M: NORMAL

## 2021-01-15 ENCOUNTER — HOSPITAL ENCOUNTER (INPATIENT)
Age: 51
LOS: 3 days | Discharge: HOME | DRG: 753 | End: 2021-01-18
Attending: EMERGENCY MEDICINE | Admitting: PSYCHIATRY & NEUROLOGY
Payer: MEDICAID

## 2021-01-15 DIAGNOSIS — R45.851 SUICIDAL IDEATION: Primary | ICD-10-CM

## 2021-01-15 PROBLEM — F19.94 SUBSTANCE INDUCED MOOD DISORDER (HCC): Status: ACTIVE | Noted: 2021-01-15

## 2021-01-15 LAB
ALBUMIN SERPL-MCNC: 3.8 G/DL (ref 3.5–5)
ALBUMIN/GLOB SERPL: 1.2 {RATIO} (ref 1.1–2.2)
ALP SERPL-CCNC: 56 U/L (ref 45–117)
ALT SERPL-CCNC: 30 U/L (ref 12–78)
AMPHET UR QL SCN: NEGATIVE
ANION GAP SERPL CALC-SCNC: 5 MMOL/L (ref 5–15)
APAP SERPL-MCNC: <2 UG/ML (ref 10–30)
APPEARANCE UR: CLEAR
AST SERPL-CCNC: 11 U/L (ref 15–37)
BACTERIA URNS QL MICRO: NEGATIVE /HPF
BARBITURATES UR QL SCN: NEGATIVE
BASOPHILS # BLD: 0 K/UL (ref 0–0.1)
BASOPHILS NFR BLD: 1 % (ref 0–1)
BENZODIAZ UR QL: NEGATIVE
BILIRUB SERPL-MCNC: 0.4 MG/DL (ref 0.2–1)
BILIRUB UR QL: NEGATIVE
BUN SERPL-MCNC: 10 MG/DL (ref 6–20)
BUN/CREAT SERPL: 18 (ref 12–20)
CALCIUM SERPL-MCNC: 8.9 MG/DL (ref 8.5–10.1)
CANNABINOIDS UR QL SCN: POSITIVE
CHLORIDE SERPL-SCNC: 109 MMOL/L (ref 97–108)
CO2 SERPL-SCNC: 24 MMOL/L (ref 21–32)
COCAINE UR QL SCN: NEGATIVE
COLOR UR: ABNORMAL
COVID-19 RAPID TEST, COVR: NOT DETECTED
CREAT SERPL-MCNC: 0.57 MG/DL (ref 0.55–1.02)
DIFFERENTIAL METHOD BLD: ABNORMAL
DRUG SCRN COMMENT,DRGCM: ABNORMAL
EOSINOPHIL # BLD: 0.3 K/UL (ref 0–0.4)
EOSINOPHIL NFR BLD: 4 % (ref 0–7)
EPITH CASTS URNS QL MICRO: ABNORMAL /LPF
ERYTHROCYTE [DISTWIDTH] IN BLOOD BY AUTOMATED COUNT: 13.2 % (ref 11.5–14.5)
ETHANOL SERPL-MCNC: <10 MG/DL
GLOBULIN SER CALC-MCNC: 3.2 G/DL (ref 2–4)
GLUCOSE SERPL-MCNC: 98 MG/DL (ref 65–100)
GLUCOSE UR STRIP.AUTO-MCNC: NEGATIVE MG/DL
HCT VFR BLD AUTO: 41.9 % (ref 35–47)
HGB BLD-MCNC: 13.9 G/DL (ref 11.5–16)
HGB UR QL STRIP: NEGATIVE
HYALINE CASTS URNS QL MICRO: ABNORMAL /LPF (ref 0–5)
IMM GRANULOCYTES # BLD AUTO: 0 K/UL (ref 0–0.04)
IMM GRANULOCYTES NFR BLD AUTO: 1 % (ref 0–0.5)
KETONES UR QL STRIP.AUTO: NEGATIVE MG/DL
LEUKOCYTE ESTERASE UR QL STRIP.AUTO: ABNORMAL
LYMPHOCYTES # BLD: 2.3 K/UL (ref 0.8–3.5)
LYMPHOCYTES NFR BLD: 33 % (ref 12–49)
MCH RBC QN AUTO: 32 PG (ref 26–34)
MCHC RBC AUTO-ENTMCNC: 33.2 G/DL (ref 30–36.5)
MCV RBC AUTO: 96.3 FL (ref 80–99)
METHADONE UR QL: NEGATIVE
MONOCYTES # BLD: 0.4 K/UL (ref 0–1)
MONOCYTES NFR BLD: 6 % (ref 5–13)
NEUTS SEG # BLD: 3.9 K/UL (ref 1.8–8)
NEUTS SEG NFR BLD: 55 % (ref 32–75)
NITRITE UR QL STRIP.AUTO: NEGATIVE
NRBC # BLD: 0 K/UL (ref 0–0.01)
NRBC BLD-RTO: 0 PER 100 WBC
OPIATES UR QL: NEGATIVE
PCP UR QL: NEGATIVE
PH UR STRIP: 6 [PH] (ref 5–8)
PLATELET # BLD AUTO: 183 K/UL (ref 150–400)
PMV BLD AUTO: 10.2 FL (ref 8.9–12.9)
POTASSIUM SERPL-SCNC: 4 MMOL/L (ref 3.5–5.1)
PROT SERPL-MCNC: 7 G/DL (ref 6.4–8.2)
PROT UR STRIP-MCNC: NEGATIVE MG/DL
RBC # BLD AUTO: 4.35 M/UL (ref 3.8–5.2)
RBC #/AREA URNS HPF: ABNORMAL /HPF (ref 0–5)
SALICYLATES SERPL-MCNC: 3.6 MG/DL (ref 2.8–20)
SODIUM SERPL-SCNC: 138 MMOL/L (ref 136–145)
SOURCE, COVRS: NORMAL
SP GR UR REFRACTOMETRY: 1.02 (ref 1–1.03)
SPECIMEN SOURCE, FCOV2M: NORMAL
SPECIMEN TYPE, XMCV1T: NORMAL
TSH SERPL DL<=0.05 MIU/L-ACNC: 1.41 UIU/ML (ref 0.36–3.74)
UR CULT HOLD, URHOLD: NORMAL
UROBILINOGEN UR QL STRIP.AUTO: 0.2 EU/DL (ref 0.2–1)
WBC # BLD AUTO: 7 K/UL (ref 3.6–11)
WBC URNS QL MICRO: ABNORMAL /HPF (ref 0–4)

## 2021-01-15 PROCEDURE — 81001 URINALYSIS AUTO W/SCOPE: CPT

## 2021-01-15 PROCEDURE — 80179 DRUG ASSAY SALICYLATE: CPT

## 2021-01-15 PROCEDURE — 82077 ASSAY SPEC XCP UR&BREATH IA: CPT

## 2021-01-15 PROCEDURE — 36415 COLL VENOUS BLD VENIPUNCTURE: CPT

## 2021-01-15 PROCEDURE — 74011250637 HC RX REV CODE- 250/637: Performed by: STUDENT IN AN ORGANIZED HEALTH CARE EDUCATION/TRAINING PROGRAM

## 2021-01-15 PROCEDURE — 80053 COMPREHEN METABOLIC PANEL: CPT

## 2021-01-15 PROCEDURE — U0005 INFEC AGEN DETEC AMPLI PROBE: HCPCS

## 2021-01-15 PROCEDURE — 87635 SARS-COV-2 COVID-19 AMP PRB: CPT

## 2021-01-15 PROCEDURE — 85025 COMPLETE CBC W/AUTO DIFF WBC: CPT

## 2021-01-15 PROCEDURE — 80307 DRUG TEST PRSMV CHEM ANLYZR: CPT

## 2021-01-15 PROCEDURE — 84443 ASSAY THYROID STIM HORMONE: CPT

## 2021-01-15 PROCEDURE — 90791 PSYCH DIAGNOSTIC EVALUATION: CPT

## 2021-01-15 PROCEDURE — 80143 DRUG ASSAY ACETAMINOPHEN: CPT

## 2021-01-15 PROCEDURE — 65220000003 HC RM SEMIPRIVATE PSYCH

## 2021-01-15 PROCEDURE — 99283 EMERGENCY DEPT VISIT LOW MDM: CPT

## 2021-01-15 RX ORDER — ADHESIVE BANDAGE
30 BANDAGE TOPICAL DAILY PRN
Status: DISCONTINUED | OUTPATIENT
Start: 2021-01-15 | End: 2021-01-18 | Stop reason: HOSPADM

## 2021-01-15 RX ORDER — ACETAMINOPHEN 325 MG/1
650 TABLET ORAL
Status: DISCONTINUED | OUTPATIENT
Start: 2021-01-15 | End: 2021-01-18 | Stop reason: HOSPADM

## 2021-01-15 RX ORDER — HALOPERIDOL 5 MG/ML
5 INJECTION INTRAMUSCULAR
Status: DISCONTINUED | OUTPATIENT
Start: 2021-01-15 | End: 2021-01-18 | Stop reason: HOSPADM

## 2021-01-15 RX ORDER — TRAZODONE HYDROCHLORIDE 50 MG/1
50 TABLET ORAL
Status: DISCONTINUED | OUTPATIENT
Start: 2021-01-15 | End: 2021-01-18 | Stop reason: HOSPADM

## 2021-01-15 RX ORDER — IBUPROFEN 200 MG
1 TABLET ORAL DAILY
Status: DISCONTINUED | OUTPATIENT
Start: 2021-01-16 | End: 2021-01-18 | Stop reason: HOSPADM

## 2021-01-15 RX ORDER — LORAZEPAM 0.5 MG/1
1 TABLET ORAL
Status: COMPLETED | OUTPATIENT
Start: 2021-01-15 | End: 2021-01-15

## 2021-01-15 RX ORDER — DIPHENHYDRAMINE HYDROCHLORIDE 50 MG/ML
50 INJECTION, SOLUTION INTRAMUSCULAR; INTRAVENOUS
Status: DISCONTINUED | OUTPATIENT
Start: 2021-01-15 | End: 2021-01-18 | Stop reason: HOSPADM

## 2021-01-15 RX ORDER — LORAZEPAM 0.5 MG/1
TABLET ORAL
Status: DISPENSED
Start: 2021-01-15 | End: 2021-01-16

## 2021-01-15 RX ORDER — LORAZEPAM 2 MG/ML
1 INJECTION INTRAMUSCULAR
Status: DISCONTINUED | OUTPATIENT
Start: 2021-01-15 | End: 2021-01-18 | Stop reason: HOSPADM

## 2021-01-15 RX ORDER — BENZTROPINE MESYLATE 1 MG/1
1 TABLET ORAL
Status: DISCONTINUED | OUTPATIENT
Start: 2021-01-15 | End: 2021-01-18 | Stop reason: HOSPADM

## 2021-01-15 RX ORDER — HYDROXYZINE 50 MG/1
50 TABLET, FILM COATED ORAL
Status: DISCONTINUED | OUTPATIENT
Start: 2021-01-15 | End: 2021-01-18 | Stop reason: HOSPADM

## 2021-01-15 RX ORDER — OLANZAPINE 5 MG/1
5 TABLET ORAL
Status: DISCONTINUED | OUTPATIENT
Start: 2021-01-15 | End: 2021-01-18 | Stop reason: HOSPADM

## 2021-01-15 RX ADMIN — LORAZEPAM 1 MG: 0.5 TABLET ORAL at 21:38

## 2021-01-15 NOTE — BSMART NOTE
Comprehensive Assessment Form Part 1      Section I - Disposition    Axis I - Substance Induced Mood Disorder  Axis II - Borderline   Axis III - Medical Chart  Axis IV - Lack of support  Axis V - 40      The Medical Doctor to Psychiatrist conference was not completed. The Medical Doctor is in agreement with Psychiatrist disposition because of increased SI with plan to overdose  The plan is admit patient  The on-call Psychiatrist consulted was Dr. Leslee Pereira  The admitting Psychiatrist will be Dr. Johnnie Jones  The admitting Diagnosis is Substance Induced Mood Disorder  The Payor source is unknown. Section II - Integrated Summary  Summary:  45 y.o. female presented to ED with increased SI with plan to overdose. Patient informed writer she was told that when she had these thoughts, if she had a plan to come to the ED. Patient was lying on her side curled up in fetal position with her hair draped in front of her face, no eye contact with writer throughout. Patient was rambling at times making nonsensical statements then speaking fine. Patient was poor historian, only providing selected information. Patient said she has been feeling depressed for the past 7 months and the past few days have been worse. Patient was able to share she is in constant pain but unable to share any further information. Patient stated \" I want to be admitted because I am not safe to return home as I will hurt myself if you let me go. \"      Patient reports 2 previous admits at [facility]. Patient unclear what her diagnosis is. Patient follows up with Dr. Berkley Perez and Therapist at [facility]. Patient lives with her partner and can return home. Partner and patient  were to  this past April in a \" [place] wedding\" but due to [place] had to cancel and this upset her and she became depressed and suicidal ideations increased. She reports she smokes large amounts of marijuana every day, unable to define for how long to writer.  Patient did mention she is on pain medications unable to provide further information. Patient did ramble about her Doctors that provide her pain medications and stated when I go upstairs \" I don't want a Dr with a penis\"  Patient was informed she is unable to choose her Doctor on [address] and she agreed to voluntary admission and  while laughing she would work with any Dr. assigned to her. Patient denies AVH, Delusions \" I know what they are and I don't have them\" Patient continued to make bizarre statements and referenced \" birthing three large kids out of her canal\" while laughing. Unclear if many of patients statements and history is substanced induced or behavioral.    Consulted with Dr. Lamonte Monroy and he agreed patient meets inpatient criteria for admission. The patienthas demonstrated mental capacity to  The information is given by the patient. The Chief Complaint is SI with plan to overdose. The Precipitant Factors are \" a lot of stress, lots happening, I am in pain\"  Previous Hospitalizations: 2 previous admits to [facility]  The patient has not previously been in restraints. Current Psychiatrist and/or  is Dr. Manan Noel.    Lethality Assessment:    The potential for suicide noted by the following: intent and defined plan . The potential for homicide is not noted. The patient has not been a perpetrator of sexual or physical abuse. There are not pending charges. The patient is felt to be at risk for self harm or harm to others. The attending nurse was advised to remove potentially harmful or dangerous items from the patient's room . Section III - Psychosocial  The patient's overall mood and attitude is bizarre, depressed, tearful at times and laughing and sarcastic. Isma Juarez Feelings of helplessness and hopelessness are observed by patients suicidal ideations. Generalized anxiety is not observed. Panic is not observed. Phobias are not observed. Obsessive compulsive tendencies are not observed.       Section IV - Mental Status Exam  The patient's appearance shows poor hygiene and is bizarre. The patient's behavior is guarded, show psychomotor dysfunction, shows poor eye contact and is restless. The patient is oriented x 2. The patient's speech is soft and is slurred. The patient's mood is depressed, is anxious, is withdrawn, is sad, is frightened, is irritable and is expansive. The range of affect is flat and is labile. The patient's thought content demonstrates no evidence of impairment. The thought process shows loose associations and shows a flight of ideas. The patient's perception demonstrated changes in the following: N/A. The patient's memory is impaired. The patient's appetite is decreased and shows signs of weight loss. The patient's sleep has evidence of insomnia. The patient shows no insight. The patient's judgement is psychologically impaired. Section V - Substance Abuse  The patient is using substances. The patient is using cannabis by inhalation for greater than 10 years with last use on unknown. The patient has experienced the following withdrawal symptoms: Patient would no discuss      Section VI - Living Arrangements  The patient has a significant other. The patient lives with a significant other. The patient has three children no information provided. The patient does plan to return home upon discharge. The patient does not have legal issues pending. The patient's source of income comes from disability and social security. Taoist and cultural practices have not been voiced at this time. The patient's greatest support comes from unknown and this person unknown be involved with the treatment. The patient hasbeen in an event described as horrible or outside the realm of ordinary life experience either currently or in the past.  The patient has not been a victim of sexual/physical abuse.     Section VII - Other Areas of Clinical Concern  The highest grade achieved is unknown with the overall quality of school experience being described as unknown. The patient is currently employed and speaks [employer] as a primary language. The patient  affecting communication. The patient's preference for learning can be described as: unknown.   The patient's hearing is normal.  The patient's vision unknown      Hood Dawkins MA

## 2021-01-15 NOTE — ED PROVIDER NOTES
The history is provided by the patient. Mental Health Problem   This is a chronic problem. Episode onset: 7 months ago. The problem has been gradually worsening. Pertinent negatives include no hallucinations, no self-injury and no violence. Mental status baseline is normal.  Her past medical history is significant for depression (with thoughts of self harm). Past Medical History:   Diagnosis Date    Adverse effect of anesthesia     after 2011 D+C/ablation, pt reports low heart rate during surgery, admitted overnight for observation. no further problems. pt reports no problems surgeries since    Fibromyalgia     GERD (gastroesophageal reflux disease)     H/O seasonal allergies     Migraine     TRACEE on CPAP     PTSD (post-traumatic stress disorder)     Sinus pressure     fluid/pain in ears       Past Surgical History:   Procedure Laterality Date    HX CARPAL TUNNEL RELEASE Right ~2015    HX CYST INCISION AND DRAINAGE      back- under general anesthesia    HX DILATION AND CURETTAGE  2011 and 2014    ablation x2    HX KNEE ARTHROSCOPY Right     HX LAP CHOLECYSTECTOMY  2015    HX TONSILLECTOMY  age 5   [de-identified] TUBAL LIGATION      HX TYMPANOSTOMY Right 2017         No family history on file.     Social History     Socioeconomic History    Marital status:      Spouse name: Not on file    Number of children: Not on file    Years of education: Not on file    Highest education level: Not on file   Occupational History    Not on file   Social Needs    Financial resource strain: Not on file    Food insecurity     Worry: Not on file     Inability: Not on file    Transportation needs     Medical: Not on file     Non-medical: Not on file   Tobacco Use    Smoking status: Current Every Day Smoker     Packs/day: 1.00    Smokeless tobacco: Never Used   Substance and Sexual Activity    Alcohol use: No    Drug use: Yes     Frequency: 7.0 times per week     Types: Marijuana    Sexual activity: Not on file   Lifestyle    Physical activity     Days per week: Not on file     Minutes per session: Not on file    Stress: Not on file   Relationships    Social connections     Talks on phone: Not on file     Gets together: Not on file     Attends Hindu service: Not on file     Active member of club or organization: Not on file     Attends meetings of clubs or organizations: Not on file     Relationship status: Not on file    Intimate partner violence     Fear of current or ex partner: Not on file     Emotionally abused: Not on file     Physically abused: Not on file     Forced sexual activity: Not on file   Other Topics Concern    Not on file   Social History Narrative    Not on file         ALLERGIES: Latex, Sulfa (sulfonamide antibiotics), Betadine [povidone-iodine], Codeine, Ibuprofen, and Nsaids (non-steroidal anti-inflammatory drug)    Review of Systems   Psychiatric/Behavioral: Negative for hallucinations and self-injury. All other systems reviewed and are negative. Vitals:    01/15/21 1647   BP: 127/86   Pulse: 86   Resp: 18   Temp: 97.8 °F (36.6 °C)   SpO2: 96%            Physical Exam  Vitals signs and nursing note reviewed. Constitutional:       General: She is not in acute distress. Appearance: She is well-developed. HENT:      Head: Normocephalic and atraumatic. Eyes:      Conjunctiva/sclera: Conjunctivae normal.   Neck:      Musculoskeletal: Neck supple. Cardiovascular:      Rate and Rhythm: Normal rate and regular rhythm. Pulmonary:      Effort: Pulmonary effort is normal. No respiratory distress. Abdominal:      General: There is no distension. Musculoskeletal: Normal range of motion. General: No deformity. Skin:     General: Skin is warm and dry. Neurological:      Mental Status: She is alert. Cranial Nerves: No cranial nerve deficit. Psychiatric:         Behavior: Behavior normal.         Thought Content: Thought content includes suicidal ideation. Thought content includes suicidal (overdose on oxycodone and klonopin) plan. MDM     70-year-old female presents with progressive depression over the last 7 months with increasing thoughts of suicide. She made a contemplating suicide and saying goodbye to friends to resolve them of any guilt upon her death. She had a plan to take oxycodone and Klonopin together to kill herself and still has access to these medications placing her in a high risk category. BSMART was consulted and will evaluate the patient who will likely need inpatient admission for psychiatric care given the severity and potential lethality of her symptoms. No other acute concerns, all questions answered. MEDICALLY CLEAR FOR TRANSFER OR PSYCHIATRIC ADMISSION.      Procedures

## 2021-01-15 NOTE — ED TRIAGE NOTES
She arrives tearful. She says she is feeling suicidal with a plan. She says she has done video goodbyes to her friends. She says she had precipitating factors in the past few days. She says she has attempted to hurt herself in the past by taking pills.

## 2021-01-16 LAB
SARS-COV-2, COV2: NOT DETECTED
SPECIMEN SOURCE, FCOV2M: NORMAL

## 2021-01-16 PROCEDURE — 74011250637 HC RX REV CODE- 250/637: Performed by: NURSE PRACTITIONER

## 2021-01-16 PROCEDURE — 65220000003 HC RM SEMIPRIVATE PSYCH

## 2021-01-16 RX ORDER — CLONAZEPAM 0.5 MG/1
0.5 TABLET ORAL
Status: DISCONTINUED | OUTPATIENT
Start: 2021-01-16 | End: 2021-01-18 | Stop reason: HOSPADM

## 2021-01-16 RX ORDER — PAROXETINE HYDROCHLORIDE 20 MG/1
40 TABLET, FILM COATED ORAL DAILY
Status: DISCONTINUED | OUTPATIENT
Start: 2021-01-16 | End: 2021-01-18 | Stop reason: HOSPADM

## 2021-01-16 RX ORDER — OXYCODONE HYDROCHLORIDE 5 MG/1
5 TABLET ORAL
COMMUNITY
End: 2022-07-23

## 2021-01-16 RX ORDER — OLANZAPINE 5 MG/1
5 TABLET ORAL
Status: DISCONTINUED | OUTPATIENT
Start: 2021-01-16 | End: 2021-01-18 | Stop reason: HOSPADM

## 2021-01-16 RX ADMIN — CLONAZEPAM 0.5 MG: 0.5 TABLET ORAL at 17:54

## 2021-01-16 RX ADMIN — PAROXETINE HYDROCHLORIDE 40 MG: 20 TABLET, FILM COATED ORAL at 14:11

## 2021-01-16 RX ADMIN — TRAZODONE HYDROCHLORIDE 50 MG: 50 TABLET ORAL at 21:04

## 2021-01-16 RX ADMIN — HYDROXYZINE HYDROCHLORIDE 50 MG: 50 TABLET, FILM COATED ORAL at 01:54

## 2021-01-16 RX ADMIN — OLANZAPINE 5 MG: 5 TABLET, FILM COATED ORAL at 01:54

## 2021-01-16 RX ADMIN — HYDROXYZINE HYDROCHLORIDE 50 MG: 50 TABLET, FILM COATED ORAL at 14:14

## 2021-01-16 RX ADMIN — OLANZAPINE 5 MG: 5 TABLET, FILM COATED ORAL at 21:01

## 2021-01-16 RX ADMIN — ACETAMINOPHEN 650 MG: 325 TABLET ORAL at 17:54

## 2021-01-16 NOTE — BH NOTES
PSYCHOSOCIAL ASSESSMENT  :Patient identifying info:  Enoch Carlisle is a 45 y.o., female admitted 1/15/2021  5:16 PM     Presenting problem and precipitating factors: Patient was admitted into the ED for SI with plan to OD. Increase in depression over the past few months, that have worsened in the past few days. She is supposedly getting  in April, but no longer can due to [place]. Per [facility] note, patient can return home. She currently sees outpatient providers, and has a hx of IP admissions for suicide attempt. Per triage, patient called her family on facetime to say goodbye to them. Patient reports that a precipitating factor includes \"devestating news about a family member\" and she would not elaborate-reports it made her question her family and is stressed about her monthly income. Reports relationship stressors. Mental status assessment: Patient is alert and oriented x4. Depressed/sad mood. She feels like she will not get any better with her arm because she is waiting for PT. Fear that she will not be able to care for herself. Became tearful during the interview and expresses hopelessness. Strengths: Patient has a home to return to and is connected to outpatient providers. Collateral informationDaynam Mooreco, , [phone] ANGELLA signed 1/16/21.      Current psychiatric /substance abuse providers and contact info: Dr. Lizzie May and Therapist at [facility]     Previous psychiatric/substance abuse providers and response to treatment: 2 previous suicide attempts at [address] [id number] history of mental illness or substance abuse: None noted     Substance abuse history:    Social History     Tobacco Use    Smoking status: Current Every Day Smoker     Packs/day: 1.00    Smokeless tobacco: Never Used   Substance Use Topics    Alcohol use: No       History of biomedical complications associated with substance abuse: None noted     Patient's current acceptance of treatment or motivation for change: Voluntary      Family constellation: Patient is in a relationship and has 3 children.     Is significant other involved? Yes       Describe support system: Significant other     Describe living arrangements and home environment: patient lives with partner     Health issues:   Hospital Problems  Date Reviewed: 2020          Codes Class Noted POA    Substance induced mood disorder (HCC) ICD-10-CM: F19.94  ICD-9-CM: 292.84  1/15/2021 Unknown              Trauma history: none noted     Legal issues: none noted     History of  service: none noted     Financial status: disability     Denominational/cultural factors: none noted     Education/work history: none noted     Have you been licensed as a health care professional (current or ): No     Leisure and recreation preferences: none noted     Describe coping skills: limited, ineffectual     Eveline Lawson  2021

## 2021-01-16 NOTE — BH NOTES
GROUP THERAPY PROGRESS NOTE    Patient is participating in Discharge/Goals Group. Group time: 50 minutes    Personal goal for participation: Process feelings related to discharge and/or feelings/goals for today. Goal orientation: Personal    Group therapy participation: active    Therapeutic interventions reviewed and discussed: Group discussion was focused on discharge plans and anxiety related to this. Group members discussed what they planned to do once discharge and discharge plans. Discussion also related to support and communication issues that arise. Group members verbalized how they are feeling today, their personal goal for today, and goals for the week. Patients were given an opportunity to share any concerns and issues they were having. Group members completed their safety plans in group. Impression of participation: [facility] actively participated in group. Patient shared her insight on meditation. Supportive of other group members and engaged in conversation.      Eveline Lawson, Supervisee in Social Work

## 2021-01-16 NOTE — PROGRESS NOTES
Problem: Depressed Mood (Adult/Pediatric)  Goal: *STG: Participates in treatment plan  Outcome: Progressing Towards Goal  Goal: *STG: Remains safe in hospital  Outcome: Progressing Towards Goal  Goal: *STG: Complies with medication therapy  Outcome: RODRICK Robertson 112  Master Treatment Plan for Karyna Varela Treatment Plan Initiated: 1/16/2021    Treatment Plan Modalities:  Type of Modality Amount  (x minutes) Frequency (x/week) Duration (x days) Name of Responsible Staff   [address] [facility] meetings to encourage peer interactions 15 7 1 Dona Shannon     Group psychotherapy to assist in building coping skills and internal controls 60 7 1 Eveline Lawson   Therapeutic activity groups to build coping skills 60 7 1 Eveline Lawson   Psychoeducation in group setting to address:   Medication education   [address] PharmD   Coping skills   30 3 1 Eveline Lawson   Relaxation techniques         Symptom management         Discharge planning   60 2 [address] [facility]    60 2 1 Chaplain ERICKSON   61 1 1 volunteer   Recovery/AA/NA      volunteer   Physician medication management   13 7 1 Dr. Queen Haney, NP   Family meeting/discharge planning   15 2 [address] [facility] and [employer]

## 2021-01-16 NOTE — BH NOTES
PRN Medication Documentation    Specific patient behavior that led to need for PRN medication: pt c/o anxiety request prn medication   Staff interventions attempted prior to PRN being given: education, coping skills, therapeutic commutation   PRN medication given: po 50 Atarax   Patient response/effectiveness of PRN medication: pt resting in bed. Calm     1757- PRN Medication Documentation    Specific patient behavior that led to need for PRN medication: pt c/o of unmanaged anxiety request anti anxiety medication, and chronic 4/10 right shoulder pain   Staff interventions attempted prior to PRN being given: education, coping skills, therapeutic communication   PRN medication given: po 450 mg Tylenol, po 0.5 mg Klonopin   Patient response/effectiveness of PRN medication: pt is requesting sleep medication.  Pt is irritable

## 2021-01-16 NOTE — PROGRESS NOTES
Problem: Depressed Mood (Adult/Pediatric)  Goal: *STG: Demonstrates reduction in symptoms and increase in insight into coping skills/future focused  Outcome: Not Progressing Towards Goal       Received pt awake in dayroom. Pt very agitated. Pt observed laughing inappropriately. She is labile. She is pacing halls, yelling hysterically, does not appear to be of sound mind, demanding pain medications, saying that noonne has helped her since she has been admitted, and slamming doors very hard. She demanded to leave if not getting the pain meds she wants; however, after being encouraged to stay based on previous SI, current unstable behaviors, and psychotic behaviors justifying a need for evaluations for TDO before leaving AMA. Continuing to monitor pt with q15 min safety rounds. Blocked Bed Documentation:    Room number: 734B  Type: Behavior  Rationale: Poor Self-Control  Anticipated duration: TBD  Additional comments: Pt very impulsive, hyperverbal, cursing out staff, intrusive, pacing halls, yelling hysterically, does not appear to be of sound mind, demanding, labile, delusional, and observed slamming doors.

## 2021-01-16 NOTE — ED NOTES
Pt states she wants to leave. She is standing up and attempting to leave. Notified BSMART who came to bedside. Updated pt that she has a room assignment, the room just needs to be clean. Pt sat back down and has agreed to wait.

## 2021-01-16 NOTE — BSMART NOTE
BSMART notified pt is calling her family on Facetime to [facility] goodbye to everyone. \" Pt has endorsed SI with a plan to OD. Pt is also med seeking,  requesting Oxycodone. Pt awaiting bed placement with [facility]. BSMART will pursue TDO if pt attempts to leave AMA.

## 2021-01-16 NOTE — SUICIDE SAFETY PLAN
SAFETY PLAN    A suicide Safety Plan is a document that supports someone when they are having thoughts of suicide. Warning Signs that indicate a suicidal crisis may be developing: What (situations, thoughts, feelings, body sensations, behaviors, etc.) do you experience that lets you know you are beginning to think about suicide? 1.   2.   3. Internal Coping Strategies:  What things can I do (relaxation techniques, hobbies, physical activities, etc.) to take my mind off my problems without contacting another person? 1.   2. 3.     People and social settings that provide distraction: Who can I call or where can I go to distract me? 1. Name:   Phone:   2. Name:   Phone:    3. Place:             4. Place:     People whom I can ask for help: Who can I call when I need help - for example, friends, family, clergy, someone else? 1. Name:                 Phone:   2. Name:   Phone:   3. Name:   Phone:     Professionals or [address] [place], [place] [address]vd I can contact during a crisis: Who can I call for help - for example, my doctor, my psychiatrist, my psychologist, a mental health provider, a suicide hotline? 1. Clinician Name:    Phone:       Clinician Pager or Emergency Contact #:     2. Clinician Name:    Phone:       Clinician Pager or Emergency Contact #:     3. Suicide Prevention Lifeline: 0-788-535-TALK (8459)    4. [address] [place] [address] Emergency Services -  for example, [facility] suicide hotline, [facility] Hotline:       Emergency Services Address:       Emergency Services Phone:     Making the environment safe: How can I make my environment (house/apartment/living space) safer? For example, can I remove guns, medications, and other items?   1.   2.

## 2021-01-16 NOTE — BH NOTES
Patient admitted voluntarily to Acute  Psychiatry, under the services of . Pt is minimally cooperatative with admission, refusing to take her rings off, stating they are stuck on her fingers. She initially refuses to remove her necklace and requests to leave. Advised that she will be assessed for TDO. She states, Leena Osorio already told me that! \" and agrees to remove the necklace. Patient currently denies suicidal ideation. Patient currently denies homicidal ideation. Pt denies any psychoses  Pt denies ETOH use. Pt admits to drug use, THC, daily. Upon admission, she requests pain meds. Offered Tylenol. Pt states that she wants something stronger as she has just had rotator cuff surgery  Advised that Tylenol is all that is prescribed. Pt refuses Tylenol and slams her door refusing to cooperate further with the admission assessment.

## 2021-01-16 NOTE — ED NOTES
With pt's verbal permission, pt's friend Alfonzo Rios, updated on plan for pt's hospital admission. No further information provided.

## 2021-01-16 NOTE — ED NOTES
Pt has become increasingly anxious and wanting to leave. Pt stated she feels like she is \"going to have a panic attack. \" Notified GAUDENCIO and Dr. Tania Lemus. 1mg PO ativan was ordered and administered, see MAR. BSMART came to speak with patient. Pt states she agrees to continue with plan for [facility] admission. TRANSFER - OUT REPORT:    Verbal report given to GILBERT Rodriguez(name) on Dennie Labella  being transferred to U(unit) for routine progression of care       Report consisted of patients Situation, Background, Assessment and   Recommendations(SBAR). Information from the following report(s) SBAR, ED Summary, [facility] and Recent Results was reviewed with the receiving nurse. Lines:       Opportunity for questions and clarification was provided. Patient transported with:   Registered Nurse and [employer] officer.

## 2021-01-16 NOTE — BH NOTES
PRN Medication Documentation    Specific patient behavior that led to need for PRN medication: Pt agitated, anxious, labile, demanding, and irritable. Staff intervention attempted prior to administration: Verbal de-escalation, and therapeutic listening. Offering PO medication    PRN Medication given: PO Zyprexa 5 mg and Atarax 50 mg.      Patient response/effectiveness to PRN: Continuing to monitor q15 min

## 2021-01-16 NOTE — BH NOTES
TRANSFER - IN REPORT:    Verbal report received from RN(name) on Carlos Orr  being received from ED(unit) for routine progression of care      Report consisted of patients Situation, Background, Assessment and   Recommendations(SBAR). Information from the following report(s) SBAR was reviewed with the receiving nurse. Opportunity for questions and clarification was provided. Assessment completed upon patients arrival to unit and care assumed.

## 2021-01-16 NOTE — BSMART NOTE
Pt has been accepted to [facility] on a VOL basis by SARAH Cuadra for Dr. Aaron Monge. Pt has been assigned to bed 734-1 to the care of Dr. Rafaela Martin. The number for report is . Pt's attening RN, Hanna Almendarez has been notified.

## 2021-01-16 NOTE — BSMART NOTE
Met with patient with  Karoline Maier. Patient was attempting to leave and has now agreed to stay and wait for bed on U. Explained to patient she will have bed placement shortly.

## 2021-01-17 LAB
CHOLEST SERPL-MCNC: 200 MG/DL
EST. AVERAGE GLUCOSE BLD GHB EST-MCNC: 100 MG/DL
GLUCOSE P FAST SERPL-MCNC: 95 MG/DL (ref 65–100)
HBA1C MFR BLD: 5.1 % (ref 4–5.6)
HDLC SERPL-MCNC: 35 MG/DL
HDLC SERPL: 5.7 {RATIO} (ref 0–5)
LDLC SERPL CALC-MCNC: 136 MG/DL (ref 0–100)
LIPID PROFILE,FLP: ABNORMAL
TRIGL SERPL-MCNC: 145 MG/DL (ref ?–150)
VLDLC SERPL CALC-MCNC: 29 MG/DL

## 2021-01-17 PROCEDURE — 74011250637 HC RX REV CODE- 250/637: Performed by: NURSE PRACTITIONER

## 2021-01-17 PROCEDURE — 36415 COLL VENOUS BLD VENIPUNCTURE: CPT

## 2021-01-17 PROCEDURE — 65220000003 HC RM SEMIPRIVATE PSYCH

## 2021-01-17 PROCEDURE — 80061 LIPID PANEL: CPT

## 2021-01-17 PROCEDURE — 83036 HEMOGLOBIN GLYCOSYLATED A1C: CPT

## 2021-01-17 PROCEDURE — 82947 ASSAY GLUCOSE BLOOD QUANT: CPT

## 2021-01-17 RX ORDER — OLANZAPINE 5 MG/1
5 TABLET ORAL
Qty: 30 TAB | Refills: 0 | Status: SHIPPED | OUTPATIENT
Start: 2021-01-17 | End: 2021-04-20

## 2021-01-17 RX ORDER — HYDROXYZINE 50 MG/1
50 TABLET, FILM COATED ORAL
Qty: 30 TAB | Refills: 0 | Status: SHIPPED | OUTPATIENT
Start: 2021-01-17

## 2021-01-17 RX ORDER — TRAZODONE HYDROCHLORIDE 50 MG/1
50 TABLET ORAL
Qty: 30 TAB | Refills: 0 | Status: SHIPPED | OUTPATIENT
Start: 2021-01-17 | End: 2022-07-23

## 2021-01-17 RX ORDER — PAROXETINE HYDROCHLORIDE 20 MG/1
40 TABLET, FILM COATED ORAL DAILY
Qty: 60 TAB | Refills: 0 | Status: SHIPPED | OUTPATIENT
Start: 2021-01-17 | End: 2021-04-20

## 2021-01-17 RX ORDER — FLUTICASONE PROPIONATE 50 MCG
2 SPRAY, SUSPENSION (ML) NASAL 2 TIMES DAILY
Qty: 1 BOTTLE | Refills: 0 | Status: SHIPPED | OUTPATIENT
Start: 2021-01-17

## 2021-01-17 RX ADMIN — OLANZAPINE 5 MG: 5 TABLET, FILM COATED ORAL at 20:41

## 2021-01-17 RX ADMIN — TRAZODONE HYDROCHLORIDE 50 MG: 50 TABLET ORAL at 20:42

## 2021-01-17 RX ADMIN — ACETAMINOPHEN 650 MG: 325 TABLET ORAL at 16:02

## 2021-01-17 RX ADMIN — HYDROXYZINE HYDROCHLORIDE 50 MG: 50 TABLET, FILM COATED ORAL at 15:57

## 2021-01-17 RX ADMIN — PAROXETINE HYDROCHLORIDE 40 MG: 20 TABLET, FILM COATED ORAL at 09:26

## 2021-01-17 NOTE — PROGRESS NOTES
Problem: Depressed Mood (Adult/Pediatric)  Goal: *STG: Participates in treatment plan  Outcome: Progressing Towards Goal  Goal: *STG: Verbalizes anger, guilt, and other feelings in a constructive manor  Outcome: Progressing Towards Goal  Goal: *STG: Attends activities and groups  Outcome: Progressing Towards Goal  Goal: *STG: Demonstrates reduction in symptoms and increase in insight into coping skills/future focused  Outcome: Progressing Towards Goal  Goal: *STG: Complies with medication therapy  Outcome: Progressing Towards Goal       Pt received resting quietly, NAD noted. She voices no complaints at this time. Will continue to monitor with q15 minute checks for safety.

## 2021-01-17 NOTE — PROGRESS NOTES
Problem: Depressed Mood (Adult/Pediatric)  Goal: *STG: Remains safe in hospital  Outcome: Progressing Towards Goal  Goal: *STG: Complies with medication therapy  Outcome: Progressing Towards Goal     Problem: Falls - Risk of  Goal: *Absence of Falls  Description: Document Mihir Fall Risk and appropriate interventions in the flowsheet. Outcome: Progressing Towards Goal  Note: Fall Risk Interventions:     Medication Interventions: Teach patient to arise slowly    Received pt out in milieu with peers. Later expresses some increased anxiety. Pt administered prn 50 mg of atarax.

## 2021-01-17 NOTE — BH NOTES
PRN Medication Documentation    Specific patient behavior that led to need for PRN medication: right shoulder pain  Staff interventions attempted prior to PRN being given:   PRN medication given:  mg tylenol  Patient response/effectiveness of PRN medication: will reassess within 1 hour

## 2021-01-17 NOTE — BH NOTES
Plan is 10 am discharge Monday 1/18/2021. Prescriptions sent to pharmacy. Pt's friend is picking her up after his work shift. PRN Medication Documentation    Specific patient behavior that led to need for PRN medication: pt request anti anxiety   Staff interventions attempted prior to PRN being given: education, coping skills, therapeutic communication   PRN medication given: 50 mg Atarax po   Patient response/effectiveness of PRN medication: pt is anxious visible on the unit.

## 2021-01-17 NOTE — H&P
[address] [facility] HISTORY AND PHYSICAL    Name:  Alvin Aleman  MR#:  [id number]  :  1970  ACCOUNT #:  [de-identified]  ADMIT DATE:  01/15/2021      INITIAL PSYCHIATRIC EVALUATION    CHIEF COMPLAINT:  \"I don't have suicidal thoughts anymore. \"    HISTORY OF PRESENTING ILLNESS:  The patient is a 78-year-old female who is currently admitted at [address] [place] [address] on a voluntary basis. She states that she is currently seeing Dr. Queen Swann at [facility] and has been seeing the psychiatrist since 2020. She is also seeing Cony Yu, a therapist at [facility]. She stated she carries a diagnosis of PTSD and anxiety. She reports that this is her third psychiatry inpatient admission, was previously admitted at [facility] twice in the past.  She reports that she has been overwhelmed two months ago. She states that several months ago, she injured her shoulder and two doctors told her that there was nothing wrong with her shoulder and that she just had to do 6 weeks of physical therapy, but then she kept insisting that she was having so much pain, and so she finally was referred to a shoulder specialist at [facility] and the shoulder specialist at [facility] told her that she had a calcium build up on her shoulder. She then had the rotator cuff surgery sometime last year, but then even after she is better after the surgery, she felt that she was traumatized by the 2 male doctors who she felt that that she was disregarded with her needs. She states that she has been feeling depressed as she had thoughts of overdosing. She shares that she has a history of overdosing in the past.  Her urine drug screen is positive for marijuana. She admits that she has been smoking marijuana on a daily basis. She also shares that she and her partner were to  this last April  but due to the [place] it was canceled and this upset her and she became depressed.   She states that the last seven months had been very difficult for her because of the injury, that she developed thoughts of suicide. During the interview, she is quite irritable, but cooperative. She denies suicidal ideation, homicidal ideation, auditory or visual hallucinations. She does not appear to be responding to internal stimuli. No thought disorganization. PAST MEDICAL HISTORY:  See H and P. Past Medical History:   Diagnosis Date    Adverse effect of anesthesia     after 2011 D+C/ablation, pt reports low heart rate during surgery, admitted overnight for observation. no further problems. pt reports no problems surgeries since    Fibromyalgia     GERD (gastroesophageal reflux disease)     H/O seasonal allergies     Migraine     TRACEE on CPAP     PTSD (post-traumatic stress disorder)     Sinus pressure     fluid/pain in ears       Labs: (reviewed/updated 1/18/2021)  No data found.   Labs Reviewed   CBC WITH AUTOMATED DIFF - Abnormal; Notable for the following components:       Result Value    IMMATURE GRANULOCYTES 1 (*)     All other components within normal limits   METABOLIC PANEL, COMPREHENSIVE - Abnormal; Notable for the following components:    Chloride 109 (*)     AST (SGOT) 11 (*)     All other components within normal limits   URINALYSIS W/MICROSCOPIC - Abnormal; Notable for the following components:    Leukocyte Esterase TRACE (*)     All other components within normal limits   DRUG SCREEN, URINE - Abnormal; Notable for the following components:    THC (TH-CANNABINOL) Positive (*)     All other components within normal limits   ACETAMINOPHEN - Abnormal; Notable for the following components:    Acetaminophen level <2 (*)     All other components within normal limits   LIPID PANEL - Abnormal; Notable for the following components:    Cholesterol, total 200 (*)     LDL, calculated 136 (*)     CHOL/HDL Ratio 5.7 (*)     All other components within normal limits   URINE CULTURE HOLD SAMPLE   SARS-COV-2, PCR ETHYL ALCOHOL   SALICYLATE   SARS-COV-2   TSH 3RD GENERATION   HEMOGLOBIN A1C WITH EAG   GLUCOSE, FASTING     Lab Results   Component Value Date/Time    Sodium 138 01/15/2021 05:59 PM    Potassium 4.0 01/15/2021 05:59 PM    Chloride 109 (H) 01/15/2021 05:59 PM    CO2 24 01/15/2021 05:59 PM    Anion gap 5 01/15/2021 05:59 PM    Glucose 95 01/17/2021 06:30 AM    BUN 10 01/15/2021 05:59 PM    Creatinine 0.57 01/15/2021 05:59 PM    BUN/Creatinine ratio 18 01/15/2021 05:59 PM    GFR est AA >60 01/15/2021 05:59 PM    GFR est non-AA >60 01/15/2021 05:59 PM    Calcium 8.9 01/15/2021 05:59 PM    Bilirubin, total 0.4 01/15/2021 05:59 PM    Alk. phosphatase 56 01/15/2021 05:59 PM    Protein, total 7.0 01/15/2021 05:59 PM    Albumin 3.8 01/15/2021 05:59 PM    Globulin 3.2 01/15/2021 05:59 PM    A-G Ratio 1.2 01/15/2021 05:59 PM    ALT (SGPT) 30 01/15/2021 05:59 PM     Admission on 01/15/2021   Component Date Value Ref Range Status    WBC 01/15/2021 7.0  3.6 - 11.0 K/uL Final    RBC 01/15/2021 4.35  3.80 - 5.20 M/uL Final    HGB 01/15/2021 13.9  11.5 - 16.0 g/dL Final    HCT 01/15/2021 41.9  35.0 - 47.0 % Final    MCV 01/15/2021 96.3  80.0 - 99.0 FL Final    MCH 01/15/2021 32.0  26.0 - 34.0 PG Final    MCHC 01/15/2021 33.2  30.0 - 36.5 g/dL Final    RDW 01/15/2021 13.2  11.5 - 14.5 % Final    PLATELET 15/80/6720 491  150 - 400 K/uL Final    MPV 01/15/2021 10.2  8.9 - 12.9 FL Final    NRBC 01/15/2021 0.0  0  WBC Final    ABSOLUTE NRBC 01/15/2021 0.00  0.00 - 0.01 K/uL Final    NEUTROPHILS 01/15/2021 55  32 - 75 % Final    LYMPHOCYTES 01/15/2021 33  12 - 49 % Final    MONOCYTES 01/15/2021 6  5 - 13 % Final    EOSINOPHILS 01/15/2021 4  0 - 7 % Final    BASOPHILS 01/15/2021 1  0 - 1 % Final    IMMATURE GRANULOCYTES 01/15/2021 1* 0.0 - 0.5 % Final    ABS. NEUTROPHILS 01/15/2021 3.9  1.8 - 8.0 K/UL Final    ABS. LYMPHOCYTES 01/15/2021 2.3  0.8 - 3.5 K/UL Final    ABS.  MONOCYTES 01/15/2021 0.4  0.0 - 1.0 K/UL Final    ABS. EOSINOPHILS 01/15/2021 0.3  0.0 - 0.4 K/UL Final    ABS. BASOPHILS 01/15/2021 0.0  0.0 - 0.1 K/UL Final    ABS. IMM. GRANS. 01/15/2021 0.0  0.00 - 0.04 K/UL Final    DF 01/15/2021 AUTOMATED    Final    Sodium 01/15/2021 138  136 - 145 mmol/L Final    Potassium 01/15/2021 4.0  3.5 - 5.1 mmol/L Final    Chloride 01/15/2021 109* 97 - 108 mmol/L Final    CO2 01/15/2021 24  21 - 32 mmol/L Final    Anion gap 01/15/2021 5  5 - 15 mmol/L Final    Glucose 01/15/2021 98  65 - 100 mg/dL Final    BUN 01/15/2021 10  6 - 20 MG/DL Final    Creatinine 01/15/2021 0.57  0.55 - 1.02 MG/DL Final    BUN/Creatinine ratio 01/15/2021 18  12 - 20   Final    GFR est AA 01/15/2021 >60  >60 ml/min/1.73m2 Final    GFR est non-AA 01/15/2021 >60  >60 ml/min/1.73m2 Final    Calcium 01/15/2021 8.9  8.5 - 10.1 MG/DL Final    Bilirubin, total 01/15/2021 0.4  0.2 - 1.0 MG/DL Final    ALT (SGPT) 01/15/2021 30  12 - 78 U/L Final    AST (SGOT) 01/15/2021 11* 15 - 37 U/L Final    Alk.  phosphatase 01/15/2021 56  45 - 117 U/L Final    Protein, total 01/15/2021 7.0  6.4 - 8.2 g/dL Final    Albumin 01/15/2021 3.8  3.5 - 5.0 g/dL Final    Globulin 01/15/2021 3.2  2.0 - 4.0 g/dL Final    A-G Ratio 01/15/2021 1.2  1.1 - 2.2   Final    ALCOHOL(ETHYL),SERUM 01/15/2021 <10  <10 MG/DL Final    Color 01/15/2021 YELLOW/STRAW    Final    Appearance 01/15/2021 CLEAR  CLEAR   Final    Specific gravity 01/15/2021 1.017  1.003 - 1.030   Final    pH (UA) 01/15/2021 6.0  5.0 - 8.0   Final    Protein 01/15/2021 Negative  NEG mg/dL Final    Glucose 01/15/2021 Negative  NEG mg/dL Final    Ketone 01/15/2021 Negative  NEG mg/dL Final    Bilirubin 01/15/2021 Negative  NEG   Final    Blood 01/15/2021 Negative  NEG   Final    Urobilinogen 01/15/2021 0.2  0.2 - 1.0 EU/dL Final    Nitrites 01/15/2021 Negative  NEG   Final    Leukocyte Esterase 01/15/2021 TRACE* NEG   Final    WBC 01/15/2021 0-4  0 - 4 /hpf Final    RBC 01/15/2021 0-5  0 - 5 /hpf Final    Epithelial cells 01/15/2021 FEW  FEW /lpf Final    Bacteria 01/15/2021 Negative  NEG /hpf Final    Hyaline cast 01/15/2021 0-2  0 - 5 /lpf Final    Urine culture hold 01/15/2021 Urine on hold in Microbiology dept for 2 days. If unpreserved urine is submitted, it cannot be used for addtional testing after 24 hours, recollection will be required.     Final    AMPHETAMINES 01/15/2021 Negative  NEG   Final    BARBITURATES 01/15/2021 Negative  NEG   Final    BENZODIAZEPINES 01/15/2021 Negative  NEG   Final    COCAINE 01/15/2021 Negative  NEG   Final    METHADONE 01/15/2021 Negative  NEG   Final    OPIATES 01/15/2021 Negative  NEG   Final    PCP(PHENCYCLIDINE) 01/15/2021 Negative  NEG   Final    THC (TH-CANNABINOL) 01/15/2021 Positive* NEG   Final    Drug screen comment 01/15/2021 (NOTE)   Final    Acetaminophen level 01/15/2021 <2* 10 - 30 ug/mL Final    Salicylate level 52/21/6776 3.6  2.8 - 20.0 MG/DL Final    Specimen source 01/15/2021 Nasopharyngeal    Final    Specimen source 01/15/2021 Nasopharyngeal    Final    COVID-19 rapid test 01/15/2021 Not detected  NOTD   Final    Specimen type 01/15/2021 NP Swab    Final    Specimen source 01/15/2021 Nasopharyngeal    Final    SARS-CoV-2 01/15/2021 Not detected  NOTD   Final    TSH 01/15/2021 1.41  0.36 - 3.74 uIU/mL Final    Hemoglobin A1c 01/17/2021 5.1  4.0 - 5.6 % Final    Est. average glucose 01/17/2021 100  mg/dL Final    Glucose 01/17/2021 95  65 - 100 MG/DL Final    LIPID PROFILE 01/17/2021        Final    Cholesterol, total 01/17/2021 200* <200 MG/DL Final    Triglyceride 01/17/2021 145  <150 MG/DL Final    HDL Cholesterol 01/17/2021 35  MG/DL Final    LDL, calculated 01/17/2021 136* 0 - 100 MG/DL Final    VLDL, calculated 01/17/2021 29  MG/DL Final    CHOL/HDL Ratio 01/17/2021 5.7* 0.0 - 5.0   Final     Vitals:    01/17/21 0841 01/17/21 1255 01/17/21 1605 01/17/21 2000   BP:  105/71 122/87 121/87 Pulse:  97 72 73   Resp:  16 16 16   Temp:  98.2 °F (36.8 °C) 98.5 °F (36.9 °C) 98.2 °F (36.8 °C)   SpO2:  94% 97% 95%   Weight: 102.7 kg (226 lb 6.4 oz)      Height: 5' 1\" (1.549 m)        No results found for this or any previous visit (from the past 24 hour(s)). RADIOLOGY REPORTS:  No results found for this or any previous visit. No results found. PAST PSYCHIATRIC HISTORY:  See above. PSYCHOSOCIAL HISTORY:  She is . She has live-in partner. She has three children. She is currently receiving social security disability income for her rotator cuff surgery. She lives with her partner. She took some courses in college. MENTAL STATUS EXAM:  She is alert and oriented in all spheres. She is dressed in hospital apparel. She reports her mood is okay. Affect is mildly constricted. Speech normal rate and rhythm. Thought process logical and goal directed. She denies suicidal ideation, homicidal ideation, or auditory or visual hallucination. No paranoia or delusions noted. Memory is intact. Intelligence seems average. Insight is poor. Judgment is poor. DIAGNOSES:  Unspecified mood disorder; cannabis use disorder, severe. TREATMENT PLANNING:  I will continue her inpatient stay. She will be provided with support and encouraged to attend groups. Her safety will be monitored. Her medications will be modified and assessed. Case Management will work on discharge planning. ASSETS AND STRENGTHS:  She is willing to seek help. She is willing to take medications. ESTIMATED LENGTH OF STAY:  5-7 days.         NARINDER ESTRADA NP [email or web address]  D:  01/16/2021 21:57  T:  01/17/2021 2:52  JOB #:  [id number]

## 2021-01-17 NOTE — BH NOTES
GROUP THERAPY PROGRESS NOTE    Patient is participating in [address] [place] [address]. Group time: 35 minutes    Personal goal for participation: reduce symptoms of anxiety     Goal orientation: Personal    Group therapy participation: active    Therapeutic interventions reviewed and discussed: Group members were handed a worksheet that discussed four strategies for reducing anxiety: deep breathing, progressive muscle relaxation, imagery, and challenging irrational thoughts. These skills were taught in session and each member was encouraged to try each activity in group and process how it can be applied outside of group. Impression of participation: Sheryle Dus actively participated in group. Patient was cheerful and pleasant. Engaged in conversation and provided insight on mediatation.  Pt wanted to leave group early because she felt triggered when she heard yelling in treatment team.     Brooke Siegel, Supervisee in Social Work

## 2021-01-17 NOTE — PROGRESS NOTES
Problem: Depressed Mood (Adult/Pediatric)  Goal: *STG: Remains safe in hospital  Outcome: Progressing Towards Goal  Goal: *STG: Complies with medication therapy  Outcome: Progressing Towards Goal     Problem: Falls - Risk of  Goal: *Absence of Falls  Description: Document Mihir Fall Risk and appropriate interventions in the flowsheet. Outcome: Progressing Towards Goal  Note: Fall Risk Interventions:     Medication Interventions: Teach patient to arise slowly    Out in millieu briefly with peers. Becomes slightly irritable and angry when told tarrot cards wouldn't be appropriate to use out in milieu with peers. Staff will continue to monitor q 15 min checks.
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
## 2021-01-18 VITALS
TEMPERATURE: 98.3 F | SYSTOLIC BLOOD PRESSURE: 115 MMHG | BODY MASS INDEX: 42.74 KG/M2 | DIASTOLIC BLOOD PRESSURE: 76 MMHG | HEIGHT: 61 IN | RESPIRATION RATE: 18 BRPM | HEART RATE: 80 BPM | OXYGEN SATURATION: 98 % | WEIGHT: 226.4 LBS

## 2021-01-18 PROCEDURE — 74011250637 HC RX REV CODE- 250/637: Performed by: NURSE PRACTITIONER

## 2021-01-18 RX ADMIN — PAROXETINE HYDROCHLORIDE 40 MG: 20 TABLET, FILM COATED ORAL at 08:35

## 2021-01-18 NOTE — PROGRESS NOTES
PSYCHIATRIC PROGRESS NOTE       Patient: Dennie Labella MRN: [id number]  SSN: [id number]    YOB: 1970  Age: 45 y.o. Sex: female      Admit Date: 1/15/2021    LOS: 2 days       Chief Complaint:  I am doing better. Interval History:  Dennie Labella reports she is doing better. She is calm and pleasant. She denies si hi or avh. Sleeping and eating ok. She's been appropriate in the unit, social with staff and peers. She is tolerating her meds and denies side effects. Denies adverse effects from initiation of zyprexa. Patient has been requesting to leave, says she has ortho appt tomorrow. CM spoke to patient's partner and partner has no safety concern and is on board with patient going home tomorrow. Past Medical History:  Past Medical History:   Diagnosis Date    Adverse effect of anesthesia     after 2011 D+C/ablation, pt reports low heart rate during surgery, admitted overnight for observation. no further problems.  pt reports no problems surgeries since    Fibromyalgia     GERD (gastroesophageal reflux disease)     H/O seasonal allergies     Migraine     TRACEE on CPAP     PTSD (post-traumatic stress disorder)     Sinus pressure     fluid/pain in ears         ALLERGIES:(reviewed/updated 1/17/2021)  Allergies   Allergen Reactions    Latex Rash and Contact Dermatitis    Sulfa (Sulfonamide Antibiotics) Anaphylaxis    Betadine [Povidone-Iodine] Hives, Rash and Contact Dermatitis    Codeine Hives    Ibuprofen Hives    Nsaids (Non-Steroidal Anti-Inflammatory Drug) Hives and Nausea and Vomiting       Laboratory report:  Lab Results   Component Value Date/Time    WBC 7.0 01/15/2021 05:59 PM    HGB 13.9 01/15/2021 05:59 PM    HCT 41.9 01/15/2021 05:59 PM    PLATELET 466 51/76/2281 05:59 PM    MCV 96.3 01/15/2021 05:59 PM      Lab Results   Component Value Date/Time    Sodium 138 01/15/2021 05:59 PM    Potassium 4.0 01/15/2021 05:59 PM    Chloride 109 (H) 01/15/2021 05:59 PM    CO2 24 01/15/2021 05:59 PM    Anion gap 5 01/15/2021 05:59 PM    Glucose 95 01/17/2021 06:30 AM    BUN 10 01/15/2021 05:59 PM    Creatinine 0.57 01/15/2021 05:59 PM    BUN/Creatinine ratio 18 01/15/2021 05:59 PM    GFR est AA >60 01/15/2021 05:59 PM    GFR est non-AA >60 01/15/2021 05:59 PM    Calcium 8.9 01/15/2021 05:59 PM    Bilirubin, total 0.4 01/15/2021 05:59 PM    Alk. phosphatase 56 01/15/2021 05:59 PM    Protein, total 7.0 01/15/2021 05:59 PM    Albumin 3.8 01/15/2021 05:59 PM    Globulin 3.2 01/15/2021 05:59 PM    A-G Ratio 1.2 01/15/2021 05:59 PM    ALT (SGPT) 30 01/15/2021 05:59 PM      Vitals:    01/17/21 0841 01/17/21 1255 01/17/21 1605 01/17/21 2000   BP:  105/71 122/87 121/87   Pulse:  97 72 73   Resp:  16 16 16   Temp:  98.2 °F (36.8 °C) 98.5 °F (36.9 °C) 98.2 °F (36.8 °C)   SpO2:  94% 97% 95%   Weight: 102.7 kg (226 lb 6.4 oz)      Height: 5' 1\" (1.549 m)          No results found for: VALF2, VALAC, VALP, VALPR, DS6, CRBAM, CRBAMP, CARB2, XCRBAM  No results found for: LITHM    Vital Signs  Patient Vitals for the past 24 hrs:   Temp Pulse Resp BP SpO2   01/17/21 2000 98.2 °F (36.8 °C) 73 16 121/87 95 %   01/17/21 1605 98.5 °F (36.9 °C) 72 16 122/87 97 %   01/17/21 1255 98.2 °F (36.8 °C) 97 16 105/71 94 %   01/17/21 0835 98.2 °F (36.8 °C) 65 16 100/65 94 %     Wt Readings from Last 3 Encounters:   01/17/21 102.7 kg (226 lb 6.4 oz)   08/04/20 99.8 kg (220 lb)   02/15/17 92.1 kg (203 lb)     Temp Readings from Last 3 Encounters:   01/17/21 98.2 °F (36.8 °C)   08/04/20 97.9 °F (36.6 °C)   02/17/17 97.7 °F (36.5 °C)     BP Readings from Last 3 Encounters:   01/17/21 121/87   08/04/20 113/71   02/17/17 108/64     Pulse Readings from Last 3 Encounters:   01/17/21 73   08/04/20 (!) 59   02/17/17 78       Radiology (reviewed/updated 1/17/2021)  No results found.     Current Facility-Administered Medications   Medication Dose Route Frequency Provider Last Rate Last Admin    PARoxetine (PAXIL) tablet 40 mg  40 mg Oral DAILY Zuleyka Genta A, NP   40 mg at 01/17/21 0926    clonazePAM (KlonoPIN) tablet 0.5 mg  0.5 mg Oral BID PRN Zuleyka Genta A, NP   0.5 mg at 01/16/21 1754    OLANZapine (ZyPREXA) tablet 5 mg  5 mg Oral QHS Haylee Calles, NP   5 mg at 01/17/21 2041    OLANZapine (ZyPREXA) tablet 5 mg  5 mg Oral Q6H PRN [place], NP   5 mg at 01/16/21 0154    haloperidol lactate (HALDOL) injection 5 mg  5 mg IntraMUSCular Q6H PRN Colin Cuadra, NP        benztropine (COGENTIN) tablet 1 mg  1 mg Oral BID PRN Katherine [place], NP        diphenhydrAMINE (BENADRYL) injection 50 mg  50 mg IntraMUSCular BID PRN Colin Cuadra, NP        hydrOXYzine HCL (ATARAX) tablet 50 mg  50 mg Oral TID PRN Katherine [place], NP   50 mg at 01/17/21 1557    LORazepam (ATIVAN) injection 1 mg  1 mg IntraMUSCular Q4H PRN Colin Cuadra, NP        traZODone (DESYREL) tablet 50 mg  50 mg Oral QHS PRN Katherine [place], NP   50 mg at 01/17/21 2042    acetaminophen (TYLENOL) tablet 650 mg  650 mg Oral Q4H PRN [place], NP   650 mg at 01/17/21 1602    magnesium hydroxide (MILK OF MAGNESIA) 400 mg/5 mL oral suspension 30 mL  30 mL Oral DAILY PRN Colin Cuadra, NP        nicotine (NICODERM CQ) 21 mg/24 hr patch 1 Patch  1 Patch TransDERmal DAILY Zayra Cuadra NP           Side Effects: (reviewed/updated 1/17/2021)  None reported or admitted to.     Review of Systems: (reviewed/updated 1/17/2021)  Appetite: good  Sleep: good   All other Review of Systems: negative    Mental Status Exam:  Eye contact: Good eye contact  Psychomotor activity:  Relaxed  Speech is spontaneous  Thought process: Logical and goal directed   Mood is \"ok\"  Affect: Full   Perception: No avh  Suicidal ideation: No si  Homicidal ideation: No hi  Insight/judgment: Partial  Cognition is grossly intact      Physical Exam:  Musculoskeletal system: steady gait  Tremor not present  Cog wheeling not present      Assessment and Plan:  Darreld Contras meets criteria for a diagnosis of  Unspecified mood disorder; cannabis use disorder, severe. Continue current medications as prescribed. We will closely monitor for safety. We will encourage reality orientation. Plan for dc in am.      I certify that this patients inpatient psychiatric hospital services furnished since the previous certification were, and continue to be, required for treatment that could reasonably be expected to improve the patient's condition, or for diagnostic study, and that the patient continues to need, on a daily basis, active treatment furnished directly by or requiring the supervision of inpatient psychiatric facility personnel. In addition, the hospital records show that services furnished were intensive treatment services, admission or related services, or equivalent services.       Signed:  Cathy Fish NP  1/17/2021

## 2021-01-18 NOTE — DISCHARGE SUMMARY
PSYCHIATRIC DISCHARGE SUMMARY      Patient: Ashly Marti MRN: [id number]  SSN: [id number]    YOB: 1970  Age: 45 y.o. Sex: female        Date of Admission: 1/15/2021  Date of Discharge:1/18/2021       Type of Discharge:  REGULAR     Admission data:  CHIEF COMPLAINT: \"I don't have suicidal thoughts anymore. \"     HISTORY OF PRESENTING ILLNESS: The patient is a 50-year-old female who is currently admitted at [address] [place] [address] on a voluntary basis. She states that she is currently seeing Dr. Radha Hawk at [facility] and has been seeing the psychiatrist since 06/2020. She is also seeing Malini Dyer, a therapist at [facility]. She stated she carries a diagnosis of PTSD and anxiety. She reports that this is her third psychiatry inpatient admission, was previously admitted at [facility] twice in the past. She reports that she has been overwhelmed two months ago. She states that several months ago, she injured her shoulder and two doctors told her that there was nothing wrong with her shoulder and that she just had to do 6 weeks of physical therapy, but then she kept insisting that she was having so much pain, and so she finally was referred to a shoulder specialist at [address] [place] [address] and the shoulder specialist at [address] [place] [address] told her that she had a calcium build up on her shoulder. She then had the rotator cuff surgery sometime last year, but then even after she is better after the surgery, she felt that she was traumatized by the 2 male doctors who she felt that that she was disregarded with her needs. She states that she has been feeling depressed as she had thoughts of overdosing. She shares that she has a history of overdosing in the past. Her urine drug screen is positive for marijuana. She admits that she has been smoking marijuana on a daily basis.  She also shares that she and her partner were to  this last April but due to the [place] it was canceled and this upset her and she became depressed. She states that the last seven months had been very difficult for her because of the injury, that she developed thoughts of suicide. During the interview, she is quite irritable, but cooperative. She denies suicidal ideation, homicidal ideation, auditory or visual hallucinations. She does not appear to be responding to internal stimuli. No thought disorganization. PAST MEDICAL HISTORY: See H and P. Past Medical History:   Diagnosis Date    Adverse effect of anesthesia     after 2011 D+C/ablation, pt reports low heart rate during surgery, admitted overnight for observation. no further problems. pt reports no problems surgeries since    Fibromyalgia     GERD (gastroesophageal reflux disease)     H/O seasonal allergies     Migraine     TRACEE on CPAP     PTSD (post-traumatic stress disorder)     Sinus pressure     fluid/pain in ears     Labs: (reviewed/updated 1/18/2021)   No data found.          Labs Reviewed   CBC WITH AUTOMATED DIFF - Abnormal; Notable for the following components:   Result Value     IMMATURE GRANULOCYTES 1 (*)     All other components within normal limits   METABOLIC PANEL, COMPREHENSIVE - Abnormal; Notable for the following components:    Chloride 109 (*)     AST (SGOT) 11 (*)     All other components within normal limits   URINALYSIS W/MICROSCOPIC - Abnormal; Notable for the following components:    Leukocyte Esterase TRACE (*)     All other components within normal limits   DRUG SCREEN, URINE - Abnormal; Notable for the following components:    THC (TH-CANNABINOL) Positive (*)     All other components within normal limits   ACETAMINOPHEN - Abnormal; Notable for the following components:    Acetaminophen level <2 (*)     All other components within normal limits   LIPID PANEL - Abnormal; Notable for the following components:    Cholesterol, total 200 (*)     LDL, calculated 136 (*)     CHOL/HDL Ratio 5.7 (*)     All other components within normal limits URINE CULTURE HOLD SAMPLE   SARS-COV-2, PCR   ETHYL ALCOHOL   SALICYLATE   SARS-COV-2   TSH 3RD GENERATION   HEMOGLOBIN A1C WITH EAG   GLUCOSE, FASTING           Lab Results   Component Value Date/Time    Sodium 138 01/15/2021 05:59 PM    Potassium 4.0 01/15/2021 05:59 PM    Chloride 109 (H) 01/15/2021 05:59 PM    CO2 24 01/15/2021 05:59 PM    Anion gap 5 01/15/2021 05:59 PM    Glucose 95 01/17/2021 06:30 AM    BUN 10 01/15/2021 05:59 PM    Creatinine 0.57 01/15/2021 05:59 PM    BUN/Creatinine ratio 18 01/15/2021 05:59 PM    GFR est AA >60 01/15/2021 05:59 PM    GFR est non-AA >60 01/15/2021 05:59 PM    Calcium 8.9 01/15/2021 05:59 PM    Bilirubin, total 0.4 01/15/2021 05:59 PM    Alk. phosphatase 56 01/15/2021 05:59 PM    Protein, total 7.0 01/15/2021 05:59 PM    Albumin 3.8 01/15/2021 05:59 PM    Globulin 3.2 01/15/2021 05:59 PM    A-G Ratio 1.2 01/15/2021 05:59 PM    ALT (SGPT) 30 01/15/2021 05:59 PM             Admission on 01/15/2021   Component Date Value Ref Range Status    WBC 01/15/2021 7.0  3.6 - 11.0 K/uL Final    RBC 01/15/2021 4.35  3.80 - 5.20 M/uL Final    HGB 01/15/2021 13.9  11.5 - 16.0 g/dL Final    HCT 01/15/2021 41.9  35.0 - 47.0 % Final    MCV 01/15/2021 96.3  80.0 - 99.0 FL Final    MCH 01/15/2021 32.0  26.0 - 34.0 PG Final    MCHC 01/15/2021 33.2  30.0 - 36.5 g/dL Final    RDW 01/15/2021 13.2  11.5 - 14.5 % Final    PLATELET 50/57/4992 849  150 - 400 K/uL Final    MPV 01/15/2021 10.2  8.9 - 12.9 FL Final    NRBC 01/15/2021 0.0  0  WBC Final    ABSOLUTE NRBC 01/15/2021 0.00  0.00 - 0.01 K/uL Final    NEUTROPHILS 01/15/2021 55  32 - 75 % Final    LYMPHOCYTES 01/15/2021 33  12 - 49 % Final    MONOCYTES 01/15/2021 6  5 - 13 % Final    EOSINOPHILS 01/15/2021 4  0 - 7 % Final    BASOPHILS 01/15/2021 1  0 - 1 % Final    IMMATURE GRANULOCYTES 01/15/2021 1* 0.0 - 0.5 % Final    ABS. NEUTROPHILS 01/15/2021 3.9  1.8 - 8.0 K/UL Final    ABS.  LYMPHOCYTES 01/15/2021 2.3  0.8 - 3.5 K/UL Final    ABS. MONOCYTES 01/15/2021 0.4  0.0 - 1.0 K/UL Final    ABS. EOSINOPHILS 01/15/2021 0.3  0.0 - 0.4 K/UL Final    ABS. BASOPHILS 01/15/2021 0.0  0.0 - 0.1 K/UL Final    ABS. IMM. GRANS. 01/15/2021 0.0  0.00 - 0.04 K/UL Final    DF 01/15/2021 AUTOMATED   Final    Sodium 01/15/2021 138  136 - 145 mmol/L Final    Potassium 01/15/2021 4.0  3.5 - 5.1 mmol/L Final    Chloride 01/15/2021 109* 97 - 108 mmol/L Final    CO2 01/15/2021 24  21 - 32 mmol/L Final    Anion gap 01/15/2021 5  5 - 15 mmol/L Final    Glucose 01/15/2021 98  65 - 100 mg/dL Final    BUN 01/15/2021 10  6 - 20 MG/DL Final    Creatinine 01/15/2021 0.57  0.55 - 1.02 MG/DL Final    BUN/Creatinine ratio 01/15/2021 18  12 - 20  Final    GFR est AA 01/15/2021 >60  >60 ml/min/1.73m2 Final    GFR est non-AA 01/15/2021 >60  >60 ml/min/1.73m2 Final    Calcium 01/15/2021 8.9  8.5 - 10.1 MG/DL Final    Bilirubin, total 01/15/2021 0.4  0.2 - 1.0 MG/DL Final    ALT (SGPT) 01/15/2021 30  12 - 78 U/L Final    AST (SGOT) 01/15/2021 11* 15 - 37 U/L Final    Alk.  phosphatase 01/15/2021 56  45 - 117 U/L Final    Protein, total 01/15/2021 7.0  6.4 - 8.2 g/dL Final    Albumin 01/15/2021 3.8  3.5 - 5.0 g/dL Final    Globulin 01/15/2021 3.2  2.0 - 4.0 g/dL Final    A-G Ratio 01/15/2021 1.2  1.1 - 2.2  Final    ALCOHOL(ETHYL),SERUM 01/15/2021 <10  <10 MG/DL Final    Color 01/15/2021 YELLOW/STRAW   Final    Appearance 01/15/2021 CLEAR  CLEAR  Final    Specific gravity 01/15/2021 1.017  1.003 - 1.030  Final    pH (UA) 01/15/2021 6.0  5.0 - 8.0  Final    Protein 01/15/2021 Negative  NEG mg/dL Final    Glucose 01/15/2021 Negative  NEG mg/dL Final    Ketone 01/15/2021 Negative  NEG mg/dL Final    Bilirubin 01/15/2021 Negative  NEG  Final    Blood 01/15/2021 Negative  NEG  Final    Urobilinogen 01/15/2021 0.2  0.2 - 1.0 EU/dL Final    Nitrites 01/15/2021 Negative  NEG  Final    Leukocyte Esterase 01/15/2021 TRACE* NEG  Final    WBC 01/15/2021 0-4  0 - 4 /hpf Final    RBC 01/15/2021 0-5  0 - 5 /hpf Final    Epithelial cells 01/15/2021 FEW  FEW /lpf Final    Bacteria 01/15/2021 Negative  NEG /hpf Final    Hyaline cast 01/15/2021 0-2  0 - 5 /lpf Final    Urine culture hold 01/15/2021 Urine on hold in Microbiology dept for 2 days. If unpreserved urine is submitted, it cannot be used for addtional testing after 24 hours, recollection will be required.    Final    AMPHETAMINES 01/15/2021 Negative  NEG  Final    BARBITURATES 01/15/2021 Negative  NEG  Final    BENZODIAZEPINES 01/15/2021 Negative  NEG  Final    COCAINE 01/15/2021 Negative  NEG  Final    METHADONE 01/15/2021 Negative  NEG  Final    OPIATES 01/15/2021 Negative  NEG  Final    PCP(PHENCYCLIDINE) 01/15/2021 Negative  NEG  Final    THC (TH-CANNABINOL) 01/15/2021 Positive* NEG  Final    Drug screen comment 01/15/2021 (NOTE)   Final    Acetaminophen level 01/15/2021 <2* 10 - 30 ug/mL Final    Salicylate level 64/38/6272 3.6  2.8 - 20.0 MG/DL Final    Specimen source 01/15/2021 Nasopharyngeal   Final    Specimen source 01/15/2021 Nasopharyngeal   Final    COVID-19 rapid test 01/15/2021 Not detected  NOTD  Final    Specimen type 01/15/2021 NP Swab   Final    Specimen source 01/15/2021 Nasopharyngeal   Final    SARS-CoV-2 01/15/2021 Not detected  NOTD  Final    TSH 01/15/2021 1.41  0.36 - 3.74 uIU/mL Final    Hemoglobin A1c 01/17/2021 5.1  4.0 - 5.6 % Final    Est. average glucose 01/17/2021 100  mg/dL Final    Glucose 01/17/2021 95  65 - 100 MG/DL Final    LIPID PROFILE 01/17/2021   Final    Cholesterol, total 01/17/2021 200* <200 MG/DL Final    Triglyceride 01/17/2021 145  <150 MG/DL Final    HDL Cholesterol 01/17/2021 35  MG/DL Final    LDL, calculated 01/17/2021 136* 0 - 100 MG/DL Final    VLDL, calculated 01/17/2021 29  MG/DL Final    CHOL/HDL Ratio 01/17/2021 5.7* 0.0 - 5.0  Final            Vitals:    01/17/21 0841 01/17/21 1255 01/17/21 1605 01/17/21 2000 BP:  105/71 122/87 121/87   Pulse:  97 72 73   Resp:  16 16 16   Temp:  98.2 °F (36.8 °C) 98.5 °F (36.9 °C) 98.2 °F (36.8 °C)   SpO2:  94% 97% 95%   Weight: 102.7 kg (226 lb 6.4 oz)      Height: 5' 1\" (1.549 m)        Recent Results      RADIOLOGY REPORTS:   No results found for this or any previous visit. No results found. PAST PSYCHIATRIC HISTORY: See above. PSYCHOSOCIAL HISTORY: She is . She has live-in partner. She has three children. She is currently receiving social security disability income for her rotator cuff surgery. She lives with her partner. She took some courses in college. MENTAL STATUS EXAM: She is alert and oriented in all spheres. She is dressed in hospital apparel. She reports her mood is okay. Affect is mildly constricted. Speech normal rate and rhythm. Thought process logical and goal directed. She denies suicidal ideation, homicidal ideation, or auditory or visual hallucination. No paranoia or delusions noted. Memory is intact. Intelligence seems average. Insight is poor. Judgment is poor. DIAGNOSES: Unspecified mood disorder; cannabis use disorder, severe. Hospital Course:    Patient was admitted to the Psychiatric services for acute psychiatric stabilization in regards to symptomatology as described in the HPI above and placed on Q15 minute checks and suicide precautions. She was started back on her usual medication regimen as well as PRN medications including paxil, flonase, klonopin, roxicodone. She was started on zyprexa, atarax, trazodone. While on the unit Shilpa De Jesus was involved in individual, group, occupational and milieu therapy. She improved gradually and was able to integrate into the milieu with help from the nursing staff. Patients symptoms improved gradually including si, anxiety, irritability, lability, depression. She was appropriate in her interactions, and cooperative with medications and the unit routine.  Please see individual progress notes for more specific details regarding patient's hospitalization course. Patient was discharged as per the plan. She had been doing well on the unit as per the report of the nursing staff and my observations. No PRN medication for agitation, seclusion or restraints were required during the last 48 hours of her stay. Shilpa De Jesus had improved progressively to the point of being stable for discharge and outpatient FU. At this time she did not offer any complaints. Patient denied any SI or HI. Denied any AH or VH. She denied any delusions. Was not considered a danger to self or to others and is safe for discharge. Will FU with her appointments and remains motivated to be in treatment. The patient verbalized understanding of her discharge instructions. Allergies:(reviewed/updated 1/18/2021)  Allergies   Allergen Reactions    Latex Rash and Contact Dermatitis    Sulfa (Sulfonamide Antibiotics) Anaphylaxis    Betadine [Povidone-Iodine] Hives, Rash and Contact Dermatitis    Codeine Hives    Ibuprofen Hives    Nsaids (Non-Steroidal Anti-Inflammatory Drug) Hives and Nausea and Vomiting       Side Effects: (reviewed/updated 1/18/2021)  None reported or admitted to.     Vital Signs:  Patient Vitals for the past 24 hrs:   Temp Pulse Resp BP SpO2   01/18/21 0744 98.3 °F (36.8 °C) 80 18 115/76 98 %   01/17/21 2000 98.2 °F (36.8 °C) 73 16 121/87 95 %   01/17/21 1605 98.5 °F (36.9 °C) 72 16 122/87 97 %   01/17/21 1255 98.2 °F (36.8 °C) 97 16 105/71 94 %     Wt Readings from Last 3 Encounters:   01/17/21 102.7 kg (226 lb 6.4 oz)   08/04/20 99.8 kg (220 lb)   02/15/17 92.1 kg (203 lb)     Temp Readings from Last 3 Encounters:   01/18/21 98.3 °F (36.8 °C)   08/04/20 97.9 °F (36.6 °C)   02/17/17 97.7 °F (36.5 °C)     BP Readings from Last 3 Encounters:   01/18/21 115/76   08/04/20 113/71   02/17/17 108/64     Pulse Readings from Last 3 Encounters:   01/18/21 80   08/04/20 (!) 59   02/17/17 78       Labs: (reviewed/updated 1/18/2021)  No results found for this or any previous visit (from the past 24 hour(s)). No results found for: VALF2, VALAC, VALP, VALPR, DS6, CRBAM, CRBAMP, CARB2, XCRBAM  No results found for: [facility]    Radiology (reviewed/updated 1/18/2021)  No results found. Mental Status Exam on Discharge:  General appearance:   Enoch Carlisle is a 45 y.o. WHITE OR  female who is well groomed, psychomotor activity is WNL  Eye contact: makes good eye contact  Speech: Spontaneous and coherent  Affect : Euthymic  Mood: \"OK\"  Thought Process: Logical, goal directed  Perception: Denies any AH or VH. Thought Content: Denies any SI or Plan  Insight: Partial  Judgement: Fair  Cognition: Intact grossly. Discharge Diagnoses:   Unspecified mood disorder; cannabis use disorder, severe. Discharge Medication List as of 1/18/2021  8:42 AM      START taking these medications    Details   hydrOXYzine HCL (ATARAX) 50 mg tablet Take 1 Tab by mouth three (3) times daily as needed for Anxiety. Indications: anxious, Normal, Disp-30 Tab, R-0      traZODone (DESYREL) 50 mg tablet Take 1 Tab by mouth nightly as needed for Sleep (For insomnia). Indications: insomnia associated with depression, Normal, Disp-30 Tab, R-0      OLANZapine (ZyPREXA) 5 mg tablet Take 1 Tab by mouth nightly. Indications: mood, Normal, Disp-30 Tab, R-0         CONTINUE these medications which have CHANGED    Details   PARoxetine (PaxiL) 20 mg tablet Take 2 Tabs by mouth daily. Indications: anxiousness associated with depression, Normal, Disp-60 Tab, R-0      fluticasone propionate (Flonase) 50 mcg/actuation nasal spray 2 Sprays by Both Nostrils route two (2) times a day. Indications: inflammation of the nose due to an allergy, Normal, Disp-1 Bottle, R-0         CONTINUE these medications which have NOT CHANGED    Details   oxyCODONE IR (ROXICODONE) 5 mg immediate release tablet Take 5 mg by mouth every six (6) hours as needed for Pain.  61 tablets filled on 1/6/2021, Historical Med      clonazePAM (KlonoPIN) 0.5 mg tablet Take 0.5 mg by mouth daily. , Historical Med      omeprazole (PRILOSEC) 20 mg capsule Take 20 mg by mouth daily as needed. Indications: GASTROESOPHAGEAL REFLUX, Historical Med         STOP taking these medications       promethazine (PHENERGAN) 12.5 mg tablet Comments:   Reason for Stopping: Follow-up Information     Follow up With Specialties Details Why Contact Info    [facility], therapist   Go on 1/18/2021 1:00PM via telehealth. [facility]     Dr. Darshan Quintana   Call on 1/18/2021 For follow-up appointment for medication management. [address] [place] [address], [phone]        WOUND CARE: none needed. Prognosis:   Good / [address] based on nature of patient's pathology/ies and treatment compliance issues. Prognosis is greatly dependent upon patient's ability to  follow up on psychiatric/psychotherapy appointments as well as to comply with psychiatric medications as prescribed. I certify that this patients inpatient psychiatric hospital services furnished since the previous certification were, and continue to be, required for treatment that could reasonably be expected to improve the patient's condition, or for diagnostic study, and that the patient continues to need, on a daily basis, active treatment furnished directly by or requiring the supervision of inpatient psychiatric facility personnel. In addition, the hospital records show that services furnished were intensive treatment services, admission or related services, or equivalent services.      Signed:  Jong Jung NP  1/18/2021

## 2021-01-18 NOTE — PROGRESS NOTES
Problem: Depressed Mood (Adult/Pediatric)  Goal: *STG: Verbalizes anger, guilt, and other feelings in a constructive manor  Outcome: Progressing Towards Goal  Goal: *STG: Attends activities and groups  Outcome: Progressing Towards Goal  Goal: *STG: Demonstrates reduction in symptoms and increase in insight into coping skills/future focused  Outcome: Progressing Towards Goal

## 2021-01-18 NOTE — PROGRESS NOTES
Problem: Depressed Mood (Adult/Pediatric)  Goal: *STG: Verbalizes anger, guilt, and other feelings in a constructive manor  Outcome: Progressing Towards Goal     Problem: Depressed Mood (Adult/Pediatric)  Goal: *STG: Attends activities and groups  Outcome: Progressing Towards Goal     Problem: Depressed Mood (Adult/Pediatric)  Goal: *STG: Demonstrates reduction in symptoms and increase in insight into coping skills/future focused  Outcome: Progressing Towards Goal

## 2021-01-18 NOTE — DISCHARGE INSTRUCTIONS
DISCHARGE SUMMARY    NAME:Patsy Ponce  : 1970  MRN: [id number]    The patient Yasemin Marie exhibits the ability to control behavior in a less restrictive environment. Patient's level of functioning is improving. No assaultive/destructive behavior has been observed for the past 24 hours. No suicidal/homicidal threat or behavior has been observed for the past 24 hours. There is no evidence of serious medication side effects. Patient has not been in physical or protective restraints for at least the past 24 hours. If weapons involved, how are they secured? No     Is patient aware of and in agreement with discharge plan? Yes     Arrangements for medication:  Prescriptions sent to pharmacy. Copy of discharge instructions to provider?:  Dr. Carter Head for transportation home:  Boyfriend to      Keep all follow up appointments as scheduled, continue to take prescribed medications per physician instructions. Mental health crisis number:  536 or your local mental health crisis line number at [phone].       Mental Health Emergency WARM LINE      [phone] ([phone])      M-F: 9am to 9pm      Sat & Sun: 5pm - 9pm  National suicide prevention lines:                             6-184-YGQVASV ([phone])       2-801-538-TALK ([phone])    Crisis Text Line:  Text HOME to 347507

## 2021-01-20 NOTE — PROGRESS NOTES
Reached out to patient at [phone] for follow up.   Patient has connected with her prescriber and her therapist.

## 2021-04-20 RX ORDER — CETIRIZINE HCL 10 MG
20 TABLET ORAL
COMMUNITY

## 2021-04-20 RX ORDER — PAROXETINE 30 MG/1
30 TABLET, FILM COATED ORAL DAILY
COMMUNITY
End: 2022-07-23

## 2021-04-20 RX ORDER — BUSPIRONE HYDROCHLORIDE 15 MG/1
15 TABLET ORAL 2 TIMES DAILY
COMMUNITY
End: 2022-07-23

## 2021-04-20 RX ORDER — TRAMADOL HYDROCHLORIDE 50 MG/1
50 TABLET ORAL
COMMUNITY
End: 2022-07-23

## 2021-04-20 NOTE — PERIOP NOTES
[facility]  Ambulatory Surgery Unit  Pre-operative Instructions    Surgery/Procedure Date  Wednesday, April 28, 2021            Tentative Arrival Time [id number]      1. On the day of your surgery/procedure, please report to the Ambulatory Surgery Unit Registration Desk and sign in at your designated time. The Ambulatory Surgery Unit is located in [facility] on the [facility] side of the [facility] across from the [facility]. Please have all of your health insurance cards and a photo ID. 2. You must have someone with you to drive you home, as you should not drive a car for 24 hours following anesthesia. Please make arrangements for a responsible adult friend or family member to stay with you for at least the first 24 hours after your surgery. 3. Do not have anything to eat or drink (including water, gum, mints, coffee, juice) after 11:59 PM, Tuesday. This may not apply to medications prescribed by your physician. (Please note below the special instructions with medications to take the morning of surgery, if applicable.)    4. We recommend you do not drink any alcoholic beverages for 24 hours before and after your surgery. 5. Contact your surgeons office for instructions on the following medications: non-steroidal anti-inflammatory drugs (i.e. Advil, Aleve), vitamins, and supplements. (Some surgeons will want you to stop these medications prior to surgery and others may allow you to take them)   **If you are currently taking Plavix, Coumadin, Aspirin and/or other blood-thinning agents, contact your surgeon for instructions. ** Your surgeon will partner with the physician prescribing these medications to determine if it is safe to stop or if you need to continue taking. Please do not stop taking these medications without instructions from your surgeon.     6. In an effort to help prevent surgical site infection, we ask that you shower with an anti-bacterial soap (i.e. Dial/Safeguard, or the soap provided to you at your preadmission testing appointment) for 3 days prior to and on the morning of surgery, using a fresh towel after each shower. (Please begin this process with fresh bed linens.) Do not apply any lotions, powders, or deodorants after the shower on the day of your procedure. If applicable, please do not shave the operative site for 48 hours prior to surgery. 7. Wear comfortable clothes. Wear glasses instead of contacts. Do not bring any jewelry or money (other than copays or fees as instructed). Do not wear make-up, particularly mascara, the morning of your surgery. Do not wear nail polish, particularly if you are having foot /hand surgery. Wear your hair loose or down, no ponytails, buns, tomeka pins or clips. All body piercings must be removed. 8. You should understand that if you do not follow these instructions your surgery may be cancelled. If your physical condition changes (i.e. fever, cold or flu) please contact your surgeon as soon as possible. 9. It is important that you be on time. If a situation occurs where you may be late, or if you have any questions or problems, please call [phone].    10. Your surgery time may be subject to change. You will receive a phone call the day prior to surgery to confirm your arrival time. 11. Pediatric patients: please bring a change of clothes, diapers, bottle/sippy cup, pacifier, etc.      Special Instructions: Take all medications and inhalers, as prescribed, on the morning of surgery with a sip of water. I understand a pre-operative phone call will be made to verify my surgery time. In the event that I am not available, I give permission for a message to be left on my answering service and/or with another person?       yes    Preop instructions reviewed  Pt verbalized understanding.      ___________________      ___________________      ________________  (Signature of Patient)          (Witness) (Date and Time)

## 2021-04-24 ENCOUNTER — HOSPITAL ENCOUNTER (OUTPATIENT)
Dept: PREADMISSION TESTING | Age: 51
Discharge: HOME | End: 2021-04-24
Payer: MEDICAID

## 2021-04-24 LAB — SARS-COV-2, COV2: NORMAL

## 2021-04-24 PROCEDURE — U0005 INFEC AGEN DETEC AMPLI PROBE: HCPCS

## 2021-04-25 LAB — SARS-COV-2, COV2NT: NOT DETECTED

## 2021-04-27 ENCOUNTER — ANESTHESIA EVENT (OUTPATIENT)
Dept: SURGERY | Age: 51
End: 2021-04-27
Payer: MEDICAID

## 2021-04-28 ENCOUNTER — ANESTHESIA (OUTPATIENT)
Dept: SURGERY | Age: 51
End: 2021-04-28
Payer: MEDICAID

## 2021-04-28 ENCOUNTER — HOSPITAL ENCOUNTER (OUTPATIENT)
Age: 51
Setting detail: OUTPATIENT SURGERY
Discharge: HOME | End: 2021-04-28
Attending: OTOLARYNGOLOGY | Admitting: OTOLARYNGOLOGY
Payer: MEDICAID

## 2021-04-28 VITALS
TEMPERATURE: 97.8 F | RESPIRATION RATE: 17 BRPM | OXYGEN SATURATION: 98 % | BODY MASS INDEX: 45.88 KG/M2 | HEART RATE: 63 BPM | DIASTOLIC BLOOD PRESSURE: 64 MMHG | SYSTOLIC BLOOD PRESSURE: 107 MMHG | HEIGHT: 61 IN | WEIGHT: 243 LBS

## 2021-04-28 DIAGNOSIS — G89.18 POSTOPERATIVE PAIN: Primary | ICD-10-CM

## 2021-04-28 LAB — HCG UR QL: NEGATIVE

## 2021-04-28 PROCEDURE — 2709999900 HC NON-CHARGEABLE SUPPLY: Performed by: OTOLARYNGOLOGY

## 2021-04-28 PROCEDURE — 74011250636 HC RX REV CODE- 250/636: Performed by: ANESTHESIOLOGY

## 2021-04-28 PROCEDURE — 74011250636 HC RX REV CODE- 250/636: Performed by: NURSE ANESTHETIST, CERTIFIED REGISTERED

## 2021-04-28 PROCEDURE — 76210000050 HC AMBSU PH II REC 0.5 TO 1 HR: Performed by: OTOLARYNGOLOGY

## 2021-04-28 PROCEDURE — 77030021352 HC CBL LD SYS DISP COVD -B: Performed by: OTOLARYNGOLOGY

## 2021-04-28 PROCEDURE — 74011000250 HC RX REV CODE- 250: Performed by: ANESTHESIOLOGY

## 2021-04-28 PROCEDURE — 74011000250 HC RX REV CODE- 250: Performed by: NURSE ANESTHETIST, CERTIFIED REGISTERED

## 2021-04-28 PROCEDURE — 77030006671 HC BLD MYRIN BVR BD -A: Performed by: OTOLARYNGOLOGY

## 2021-04-28 PROCEDURE — 76060000073 HC AMB SURG ANES FIRST 0.5 HR: Performed by: OTOLARYNGOLOGY

## 2021-04-28 PROCEDURE — 77030008656 HC TU EAR GRMMT MEDT -B: Performed by: OTOLARYNGOLOGY

## 2021-04-28 PROCEDURE — 76030000002 HC AMB SURG OR TIME FIRST 0.: Performed by: OTOLARYNGOLOGY

## 2021-04-28 PROCEDURE — 77030018836 HC SOL IRR NACL ICUM -A: Performed by: OTOLARYNGOLOGY

## 2021-04-28 PROCEDURE — 74011250637 HC RX REV CODE- 250/637: Performed by: OTOLARYNGOLOGY

## 2021-04-28 PROCEDURE — 81025 URINE PREGNANCY TEST: CPT

## 2021-04-28 DEVICE — VENT TUBE 1028145 5PK SHEEHY SILICONE
Type: IMPLANTABLE DEVICE | Site: EAR | Status: FUNCTIONAL
Brand: SHEEHY

## 2021-04-28 RX ORDER — MIDAZOLAM HYDROCHLORIDE 1 MG/ML
INJECTION, SOLUTION INTRAMUSCULAR; INTRAVENOUS AS NEEDED
Status: DISCONTINUED | OUTPATIENT
Start: 2021-04-28 | End: 2021-04-28 | Stop reason: HOSPADM

## 2021-04-28 RX ORDER — OXYCODONE AND ACETAMINOPHEN 5; 325 MG/1; MG/1
1 TABLET ORAL
Status: DISCONTINUED | OUTPATIENT
Start: 2021-04-28 | End: 2021-04-28 | Stop reason: HOSPADM

## 2021-04-28 RX ORDER — ALBUTEROL SULFATE 0.83 MG/ML
SOLUTION RESPIRATORY (INHALATION)
Status: DISCONTINUED
Start: 2021-04-28 | End: 2021-04-28 | Stop reason: HOSPADM

## 2021-04-28 RX ORDER — DIPHENHYDRAMINE HYDROCHLORIDE 50 MG/ML
12.5 INJECTION, SOLUTION INTRAMUSCULAR; INTRAVENOUS AS NEEDED
Status: DISCONTINUED | OUTPATIENT
Start: 2021-04-28 | End: 2021-04-28 | Stop reason: HOSPADM

## 2021-04-28 RX ORDER — LIDOCAINE HYDROCHLORIDE 20 MG/ML
INJECTION, SOLUTION EPIDURAL; INFILTRATION; INTRACAUDAL; PERINEURAL AS NEEDED
Status: DISCONTINUED | OUTPATIENT
Start: 2021-04-28 | End: 2021-04-28 | Stop reason: HOSPADM

## 2021-04-28 RX ORDER — SODIUM CHLORIDE, SODIUM LACTATE, POTASSIUM CHLORIDE, CALCIUM CHLORIDE 600; 310; 30; 20 MG/100ML; MG/100ML; MG/100ML; MG/100ML
25 INJECTION, SOLUTION INTRAVENOUS CONTINUOUS
Status: DISCONTINUED | OUTPATIENT
Start: 2021-04-28 | End: 2021-04-28 | Stop reason: HOSPADM

## 2021-04-28 RX ORDER — LIDOCAINE HYDROCHLORIDE 10 MG/ML
0.1 INJECTION, SOLUTION EPIDURAL; INFILTRATION; INTRACAUDAL; PERINEURAL AS NEEDED
Status: DISCONTINUED | OUTPATIENT
Start: 2021-04-28 | End: 2021-04-28 | Stop reason: HOSPADM

## 2021-04-28 RX ORDER — SODIUM CHLORIDE 0.9 % (FLUSH) 0.9 %
5-40 SYRINGE (ML) INJECTION EVERY 8 HOURS
Status: DISCONTINUED | OUTPATIENT
Start: 2021-04-28 | End: 2021-04-28 | Stop reason: HOSPADM

## 2021-04-28 RX ORDER — CIPROFLOXACIN AND FLUOCINOLONE ACETONIDE .75; .0625 MG/.25ML; MG/.25ML
SOLUTION AURICULAR (OTIC) AS NEEDED
Status: DISCONTINUED | OUTPATIENT
Start: 2021-04-28 | End: 2021-04-28 | Stop reason: HOSPADM

## 2021-04-28 RX ORDER — SODIUM CHLORIDE 0.9 % (FLUSH) 0.9 %
5-40 SYRINGE (ML) INJECTION AS NEEDED
Status: DISCONTINUED | OUTPATIENT
Start: 2021-04-28 | End: 2021-04-28 | Stop reason: HOSPADM

## 2021-04-28 RX ORDER — FENTANYL CITRATE 50 UG/ML
25 INJECTION, SOLUTION INTRAMUSCULAR; INTRAVENOUS
Status: DISCONTINUED | OUTPATIENT
Start: 2021-04-28 | End: 2021-04-28 | Stop reason: HOSPADM

## 2021-04-28 RX ORDER — FENTANYL CITRATE 50 UG/ML
INJECTION, SOLUTION INTRAMUSCULAR; INTRAVENOUS AS NEEDED
Status: DISCONTINUED | OUTPATIENT
Start: 2021-04-28 | End: 2021-04-28 | Stop reason: HOSPADM

## 2021-04-28 RX ORDER — MORPHINE SULFATE 10 MG/ML
2 INJECTION, SOLUTION INTRAMUSCULAR; INTRAVENOUS
Status: DISCONTINUED | OUTPATIENT
Start: 2021-04-28 | End: 2021-04-28 | Stop reason: HOSPADM

## 2021-04-28 RX ORDER — MECLIZINE HYDROCHLORIDE 25 MG/1
25 TABLET ORAL ONCE
Status: COMPLETED | OUTPATIENT
Start: 2021-04-28 | End: 2021-04-28

## 2021-04-28 RX ORDER — ALBUTEROL SULFATE 0.83 MG/ML
2.5 SOLUTION RESPIRATORY (INHALATION)
Status: COMPLETED | OUTPATIENT
Start: 2021-04-28 | End: 2021-04-28

## 2021-04-28 RX ORDER — PROPOFOL 10 MG/ML
INJECTION, EMULSION INTRAVENOUS AS NEEDED
Status: DISCONTINUED | OUTPATIENT
Start: 2021-04-28 | End: 2021-04-28 | Stop reason: HOSPADM

## 2021-04-28 RX ORDER — ONDANSETRON 2 MG/ML
INJECTION INTRAMUSCULAR; INTRAVENOUS AS NEEDED
Status: DISCONTINUED | OUTPATIENT
Start: 2021-04-28 | End: 2021-04-28 | Stop reason: HOSPADM

## 2021-04-28 RX ORDER — ONDANSETRON 2 MG/ML
4 INJECTION INTRAMUSCULAR; INTRAVENOUS AS NEEDED
Status: DISCONTINUED | OUTPATIENT
Start: 2021-04-28 | End: 2021-04-28 | Stop reason: HOSPADM

## 2021-04-28 RX ORDER — KETAMINE HYDROCHLORIDE 10 MG/ML
INJECTION, SOLUTION INTRAMUSCULAR; INTRAVENOUS AS NEEDED
Status: DISCONTINUED | OUTPATIENT
Start: 2021-04-28 | End: 2021-04-28 | Stop reason: HOSPADM

## 2021-04-28 RX ORDER — MECLIZINE HYDROCHLORIDE 25 MG/1
TABLET ORAL
Status: DISCONTINUED
Start: 2021-04-28 | End: 2021-04-28 | Stop reason: HOSPADM

## 2021-04-28 RX ADMIN — MIDAZOLAM HYDROCHLORIDE 2 MG: 1 INJECTION, SOLUTION INTRAMUSCULAR; INTRAVENOUS at 07:29

## 2021-04-28 RX ADMIN — FENTANYL CITRATE 25 MCG: 50 INJECTION, SOLUTION INTRAMUSCULAR; INTRAVENOUS at 07:30

## 2021-04-28 RX ADMIN — KETAMINE HYDROCHLORIDE 10 MG: 10 INJECTION, SOLUTION INTRAMUSCULAR; INTRAVENOUS at 07:33

## 2021-04-28 RX ADMIN — PROPOFOL 50 MG: 10 INJECTION, EMULSION INTRAVENOUS at 07:33

## 2021-04-28 RX ADMIN — ONDANSETRON HYDROCHLORIDE 4 MG: 2 INJECTION, SOLUTION INTRAMUSCULAR; INTRAVENOUS at 07:46

## 2021-04-28 RX ADMIN — PROPOFOL 50 MG: 10 INJECTION, EMULSION INTRAVENOUS at 07:35

## 2021-04-28 RX ADMIN — MECLIZINE HYDROCHLORIDE 25 MG: 25 TABLET ORAL at 07:02

## 2021-04-28 RX ADMIN — PROPOFOL 50 MG: 10 INJECTION, EMULSION INTRAVENOUS at 07:43

## 2021-04-28 RX ADMIN — SODIUM CHLORIDE, POTASSIUM CHLORIDE, SODIUM LACTATE AND CALCIUM CHLORIDE 25 ML/HR: 600; 310; 30; 20 INJECTION, SOLUTION INTRAVENOUS at 07:00

## 2021-04-28 RX ADMIN — LIDOCAINE HYDROCHLORIDE 40 MG: 20 INJECTION, SOLUTION EPIDURAL; INFILTRATION; INTRACAUDAL; PERINEURAL at 07:33

## 2021-04-28 RX ADMIN — KETAMINE HYDROCHLORIDE 15 MG: 10 INJECTION, SOLUTION INTRAMUSCULAR; INTRAVENOUS at 07:34

## 2021-04-28 RX ADMIN — FENTANYL CITRATE 25 MCG: 50 INJECTION, SOLUTION INTRAMUSCULAR; INTRAVENOUS at 07:43

## 2021-04-28 RX ADMIN — FENTANYL CITRATE 25 MCG: 50 INJECTION, SOLUTION INTRAMUSCULAR; INTRAVENOUS at 07:34

## 2021-04-28 RX ADMIN — FENTANYL CITRATE 25 MCG: 50 INJECTION, SOLUTION INTRAMUSCULAR; INTRAVENOUS at 07:42

## 2021-04-28 RX ADMIN — ALBUTEROL SULFATE 2.5 MG: 2.5 SOLUTION RESPIRATORY (INHALATION) at 07:02

## 2021-04-28 NOTE — ANESTHESIA PREPROCEDURE EVALUATION
Anesthetic History   No history of anesthetic complications            Review of Systems / Medical History  Patient summary reviewed, nursing notes reviewed and pertinent labs reviewed    Pulmonary        Sleep apnea: CPAP  Smoker (1 ppd & MJ ; quit tobacco 10/20)      Comments: chronic OM  Allergies with wheezing   Neuro/Psych         Headaches and psychiatric history    Comments: PTSD  migraines Cardiovascular  Within defined limits                Exercise tolerance: >4 METS     GI/Hepatic/Renal     GERD: well controlled           Endo/Other        Morbid obesity     Other Findings   Comments: Fibromyalgia          Physical Exam    Airway  Mallampati: I  TM Distance: 4 - 6 cm  Neck ROM: normal range of motion   Mouth opening: Normal     Cardiovascular    Rhythm: regular  Rate: normal         Dental  No notable dental hx       Pulmonary        Wheezes:bilateral        Comments: Expiratory  Abdominal  GI exam deferred       Other Findings            Anesthetic Plan    ASA: 3  Anesthesia type: general          Induction: Intravenous  Anesthetic plan and risks discussed with: Patient      Albuterol neb preop.  Meclizine po preop

## 2021-04-28 NOTE — PERIOP NOTES
Winston Landeros  1970  [id number]    Situation:  Verbal report given from: JÚNIOR Cassidy CRNA, RN  Procedure: Procedure(s):  BILATERAL MYRINGOTOMY WITH TUBE PLACEMENT    Background:    Preoperative diagnosis: CHRONIC OTITIS MEDIA    Postoperative diagnosis: CHRONIC OTITIS MEDIA    :  Dr. Virgil Reece    Assistant(s): Circ-1: Maria Luisa Mathis RN  Scrub Tech-1: Lali Daley    Specimens: * No specimens in log *    Assessment:  Intra-procedure medications         Anesthesia gave intra-procedure sedation and medications, see anesthesia flow sheet     Intravenous fluids: LR@ KVO     Vital signs stable       Recommendation:

## 2021-04-28 NOTE — DISCHARGE INSTRUCTIONS
[facility], PMILO Hagan. Stacy Rose M.D., Ph.D., F.A.C.S. [place]ree [place], [address] [facility]  [phone]    INSTRUCTIONS CONCERNING EAR TUBES    IN RECOVERY:  You may feel dizzy and have blurred vision from anesthesia. WHAT TO EXPECT AFTER DISCHARGE:  1. You eat when they get home but nothing heavy or greasy for the first meal.  2. There may be some blood in the ear or mucoid drainage for 2-3 days after surgery. Any continued drainage or temperature elevation may indicate infection in which case the office should be contacted. Change cotton balls as needed. 3. The ear tubes usually stay in place 12-24 months. You should be seen in the office every 6 months until the tube extrudes itself spontaneously. 4. Keep ear canals dry as long as ear tubes are in the ears! Use ear plugs or cotton balls coated with Vaseline when bathing. Extra protection should be used when swimming in lakes, rivers, or oceans. Use prescribed eardrops after swimming. No underwater swimming, jumping or diving. Macks ear plugs may be purchased at a drug store or our office can fit docsplugs for your convenience. Ear plugs are not needed for swimming in chlorinated pools. 5. Continue ear drops previously prescribed. Place 3 drops in each ear 3 times a day for 3 days. Keep the rest to use should further ear infections or drainage occur. 6. Please call my office at [phone] for an appointment for 3 weeks. Be sure to ask to have an appointment with the audiologist at the same time. 7. Tylenol or Motrin can be used for pain or fever. 8. Flying is permitted after the tubes are in place. 9. Notify your doctor if you see drainage from the ear which is green, yellow, or has a foul odor. Call my office at [phone] if you have any questions. DO NOT DRIVE WHILE TAKING NARCOTIC PAIN MEDICATIONS.     DO NOT TAKE SLEEPING MEDICATIONS OR ANTIANXIETY MEDICATIONS WHILE TAKING NARCOTIC PAIN MEDICATIONS,  ESPECIALLY THE NIGHT OF ANESTHESIA! CPAP PATIENTS BE SURE TO WEAR MACHINE WHENEVER NAPPING OR SLEEPING! DISCHARGE SUMMARY from Nurse    The following personal items collected during your admission are returned to you:   Dental Appliance: Dental Appliances: None  Vision: Visual Aid: Glasses(PACU)  Hearing Aid:    Jewelry: Jewelry: None  Clothing: Clothing: With patient  Other Valuables: Other Valuables: Eyeglasses, Cell Phone(PACU)  Valuables sent to safe:        PATIENT INSTRUCTIONS:    After General Anesthesia or Intravenous Sedation, for 24 hours or while taking prescription Narcotics:        Someone should be with you for the next 24 hours. For your own safety, a responsible adult must drive you home. · Limit your activities  · Recommended activity: Rest today, up with assistance today. Do not climb stairs or shower unattended for the next 24 hours. · Please start with a soft bland diet and advance as tolerated (no nausea) to regular diet. · If you have a sore throat you should try the following: fluids, warm salt water gargles, or throat lozenges. If it does not improve after several days please follow up with your primary physician. · Do not drive and operate hazardous machinery  · Do not make important personal or business decisions  · Do  not drink alcoholic beverages  · If you have not urinated within 8 hours after discharge, please contact your surgeon on call.     Report the following to your surgeon:  · Excessive pain, swelling, redness or odor of or around the surgical area  · Temperature over 100.5  · Nausea and vomiting lasting longer than 4 hours or if unable to take medications  · Any signs of decreased circulation or nerve impairment to extremity: change in color, persistent  numbness, tingling, coldness or increase pain      · You will receive a Post Operative Call from one of the Recovery Room Nurses on the day after your surgery to check on you. It is very important for us to know how you are recovering after your surgery. If you have an issue or need to speak with someone, please call your surgeon, do not wait for the post operative call. · You may receive an e-mail or letter in the mail from [facility] regarding your experience with us in the Ambulatory Surgery Unit. Your feedback is valuable to us and we appreciate your participation in the survey. · If the above instructions are not adequate or you are having problems after your surgery, call the physician at their office number. · We wish you a speedy recovery ? What to do at Home:      *  Please give a list of your current medications to your Primary Care Provider. *  Please update this list whenever your medications are discontinued, doses are      changed, or new medications (including over-the-counter products) are added. *  Please carry medication information at all times in case of emergency situations. If you have not received your influenza and/or pneumococcal vaccine, please follow up with your primary care physician. The discharge information has been reviewed with the patient and caregiver. The patient and caregiver verbalized understanding.

## 2021-04-28 NOTE — PERIOP NOTES
Pt tolerating liquids, vss.  Pt denies pain. 0819-D/c instructions reviewed, all questions answered. Reviewed when to call the doctor, diet, and activity. Reviewed what to take for pain. 0832-Transported via w/c to awaiting transportation.

## 2021-04-28 NOTE — PERIOP NOTES
Permission received to review discharge instructions and discuss private health information with Christel Wus, boyfriend.

## 2021-04-28 NOTE — OP NOTES
[address]  OPERATIVE REPORT    Name:  Eliot Delacruz  MR#:  [id number]  :  1970  ACCOUNT #:  [de-identified]  DATE OF SERVICE:  2021    PREOPERATIVE DIAGNOSIS:  Chronic otitis media. POSTOPERATIVE DIAGNOSIS:  Chronic otitis media. PROCEDURE PERFORMED:  Bilateral myringotomy and tubing. SURGEON:  Hussein Willams MD    ASSISTANT:  None. ANESTHESIA:  General.    COMPLICATIONS:  None apparent. SPECIMENS REMOVED:  None. IMPLANTS:  Bilateral silicone tympanostomy tubes. ESTIMATED BLOOD LOSS:  0.2 mL. INDICATIONS:  The patient is a 28-year-old female with a long history of chronic eustachian tube dysfunction requiring myringotomy and tubing procedures in the past.  The patient was observed recently to have recurrent fluid and ear discomfort consistent with extrusion of her previous ventilation tubes. Myringotomy and tubing in the office under local anesthesia was discussed with the patient who declined in favor of general anesthesia. Preoperatively, the alternatives, potential benefits, and possible risks of the procedure were explained to the patient, who understood and requested to proceed. PROCEDURE:  The patient was brought to the operating room, placed supine on the operating table and following the smooth induction of general IV anesthesia, the patient's right ear was examined with the use of the operating microscope. An anterior-inferior myringotomy allowed suctioning of serous middle ear fluid and placement of a silicone collar button ventilation tube without difficulty. After application of topical ear drops, the patient's right ear procedure was concluded. The patient was repositioned for left ear examination. Similar findings on the left side including retraction of the tympanic membrane and extrusion of the previous ventilation tube were observed.   The previous ventilation tube was observed to be crusted and lateral to the tympanic membrane and was removed. An anterior-inferior myringotomy allowed suctioning of serous middle ear fluid and placement of a silicone collar button ventilation tube without difficulty. After irrigation with ototopical drops, the patient's procedure was concluded. The patient was aroused from anesthesia and transferred to the recovery area in satisfactory condition.       Ely Palacios MD [id number]  D:  04/28/2021 7:58  T:  04/28/2021 12:11  JOB #:  [id number]

## 2021-04-28 NOTE — BRIEF OP NOTE
Brief Postoperative Note    Patient: Sonia Leonard  YOB: 1970  MRN: [id number]    Date of Procedure: 4/28/2021     Pre-Op Diagnosis: CHRONIC OTITIS MEDIA    Post-Op Diagnosis: SAME    Procedure(s):  BILATERAL MYRINGOTOMY WITH TUBE PLACEMENT    Surgeon(s):  Iván Singletary MD    Surgical Assistant: None    Anesthesia: General     Estimated Blood Loss (mL): 0.2    Complications: None apparent    Specimens: * No specimens in log *     Implants:   Implant Name Type Inv. Item Serial No.  Lot No. LRB No. Used Action   TUBE TYMPANOSTOMY DIA1. 27MM GRN CLARISA VENT CLLR BTTN YASMANI - SN/A  TUBE TYMPANOSTOMY DIA1. 27MM GRN CLARISA VENT CLLR BTTN [facility] N/A [employer] [id number] Right 1 Implanted   TUBE TYMPANOSTOMY DIA1. 27MM GRN CLARISA VENT CLLR BTTN YASMANI - SN/A  TUBE TYMPANOSTOMY DIA1. 27MM GRN CLARISA VENT CLLR BTTN [facility] N/A [employer] [id number] Left 1 Implanted       Drains: * No LDAs found *    Findings: As expected.     Electronically Signed by Jole Hicks MD on 4/28/2021 at 7:51 AM

## 2021-04-28 NOTE — ANESTHESIA POSTPROCEDURE EVALUATION
Procedure(s):  BILATERAL MYRINGOTOMY WITH TUBE PLACEMENT. general    Anesthesia Post Evaluation      Multimodal analgesia: multimodal analgesia used between 6 hours prior to anesthesia start to PACU discharge  Patient location during evaluation: PACU  Patient participation: complete - patient participated  Level of consciousness: awake and alert  Pain score: 0  Airway patency: patent  Anesthetic complications: no  Cardiovascular status: acceptable  Respiratory status: acceptable  Hydration status: acceptable  Post anesthesia nausea and vomiting:  none  Final Post Anesthesia Temperature Assessment:  Normothermia (36.0-37.5 degrees C)      INITIAL Post-op Vital signs:   Vitals Value Taken Time   /64 04/28/21 0820   Temp 36.6 °C (97.8 °F) 04/28/21 0807   Pulse 64 04/28/21 0820   Resp 16 04/28/21 0820   SpO2 99 % 04/28/21 0820   Vitals shown include unvalidated device data.

## 2022-03-20 PROBLEM — F19.94 SUBSTANCE INDUCED MOOD DISORDER (HCC): Status: ACTIVE | Noted: 2021-01-15

## 2022-07-23 ENCOUNTER — HOSPITAL ENCOUNTER (EMERGENCY)
Age: 52
Discharge: LEFT BEFORE DISCHARGE | End: 2022-07-23
Attending: EMERGENCY MEDICINE
Payer: MEDICAID

## 2022-07-23 VITALS
BODY MASS INDEX: 41.21 KG/M2 | RESPIRATION RATE: 18 BRPM | WEIGHT: 218.26 LBS | SYSTOLIC BLOOD PRESSURE: 123 MMHG | HEIGHT: 61 IN | HEART RATE: 108 BPM | TEMPERATURE: 98.4 F | OXYGEN SATURATION: 95 % | DIASTOLIC BLOOD PRESSURE: 81 MMHG

## 2022-07-23 DIAGNOSIS — F43.10 PTSD (POST-TRAUMATIC STRESS DISORDER): Primary | ICD-10-CM

## 2022-07-23 DIAGNOSIS — F39 MOOD DISORDER (HCC): ICD-10-CM

## 2022-07-23 LAB
ALBUMIN SERPL-MCNC: 4.1 G/DL (ref 3.5–5)
ALBUMIN/GLOB SERPL: 1.1 {RATIO} (ref 1.1–2.2)
ALP SERPL-CCNC: 73 U/L (ref 45–117)
ALT SERPL-CCNC: 22 U/L (ref 12–78)
ANION GAP SERPL CALC-SCNC: 6 MMOL/L (ref 5–15)
AST SERPL-CCNC: 9 U/L (ref 15–37)
BASOPHILS # BLD: 0 K/UL (ref 0–0.1)
BASOPHILS NFR BLD: 0 % (ref 0–1)
BILIRUB SERPL-MCNC: 0.6 MG/DL (ref 0.2–1)
BUN SERPL-MCNC: 12 MG/DL (ref 6–20)
BUN/CREAT SERPL: 14 (ref 12–20)
CALCIUM SERPL-MCNC: 9.2 MG/DL (ref 8.5–10.1)
CHLORIDE SERPL-SCNC: 115 MMOL/L (ref 97–108)
CO2 SERPL-SCNC: 20 MMOL/L (ref 21–32)
COMMENT, HOLDF: NORMAL
CREAT SERPL-MCNC: 0.86 MG/DL (ref 0.55–1.02)
DIFFERENTIAL METHOD BLD: ABNORMAL
EOSINOPHIL # BLD: 0.1 K/UL (ref 0–0.4)
EOSINOPHIL NFR BLD: 1 % (ref 0–7)
ERYTHROCYTE [DISTWIDTH] IN BLOOD BY AUTOMATED COUNT: 13.7 % (ref 11.5–14.5)
ETHANOL SERPL-MCNC: <10 MG/DL
GLOBULIN SER CALC-MCNC: 3.7 G/DL (ref 2–4)
GLUCOSE SERPL-MCNC: 111 MG/DL (ref 65–100)
HCT VFR BLD AUTO: 47.1 % (ref 35–47)
HGB BLD-MCNC: 16.1 G/DL (ref 11.5–16)
IMM GRANULOCYTES # BLD AUTO: 0 K/UL (ref 0–0.04)
IMM GRANULOCYTES NFR BLD AUTO: 0 % (ref 0–0.5)
LYMPHOCYTES # BLD: 3.3 K/UL (ref 0.8–3.5)
LYMPHOCYTES NFR BLD: 30 % (ref 12–49)
MCH RBC QN AUTO: 31.8 PG (ref 26–34)
MCHC RBC AUTO-ENTMCNC: 34.2 G/DL (ref 30–36.5)
MCV RBC AUTO: 93.1 FL (ref 80–99)
MONOCYTES # BLD: 0.7 K/UL (ref 0–1)
MONOCYTES NFR BLD: 6 % (ref 5–13)
NEUTS SEG # BLD: 7.1 K/UL (ref 1.8–8)
NEUTS SEG NFR BLD: 63 % (ref 32–75)
NRBC # BLD: 0 K/UL (ref 0–0.01)
NRBC BLD-RTO: 0 PER 100 WBC
PLATELET # BLD AUTO: 235 K/UL (ref 150–400)
PMV BLD AUTO: 9.9 FL (ref 8.9–12.9)
POTASSIUM SERPL-SCNC: 3.8 MMOL/L (ref 3.5–5.1)
PROT SERPL-MCNC: 7.8 G/DL (ref 6.4–8.2)
RBC # BLD AUTO: 5.06 M/UL (ref 3.8–5.2)
SAMPLES BEING HELD,HOLD: NORMAL
SODIUM SERPL-SCNC: 141 MMOL/L (ref 136–145)
WBC # BLD AUTO: 11.2 K/UL (ref 3.6–11)

## 2022-07-23 PROCEDURE — 82077 ASSAY SPEC XCP UR&BREATH IA: CPT

## 2022-07-23 PROCEDURE — 36415 COLL VENOUS BLD VENIPUNCTURE: CPT

## 2022-07-23 PROCEDURE — 99283 EMERGENCY DEPT VISIT LOW MDM: CPT

## 2022-07-23 PROCEDURE — 80053 COMPREHEN METABOLIC PANEL: CPT

## 2022-07-23 PROCEDURE — 85025 COMPLETE CBC W/AUTO DIFF WBC: CPT

## 2022-07-23 RX ORDER — RIZATRIPTAN BENZOATE 10 MG/1
10 TABLET, ORALLY DISINTEGRATING ORAL
COMMUNITY

## 2022-07-23 NOTE — Clinical Note
The patient has decided to leave against medical advice, because she didn't want to dress in green gown and didn't want to give her belongings up. She has normal mental status and adequate capacity to make medical decisions. The patient refuses  hospital admission and wants to be discharged. The risks have been explained to the patient, including worsening illness, chronic pain, permanent disability, and death. The benefits of admission have also been explained, including the availabi lity and proximity of nurses, physicians, monitoring, diagnostic testing, and treatment. The patient was able to understand and state the risks and benefits of hospital admission. This was witnessed by nurse Jonny Ponce RN and me. She had t he opportunity to ask questions about her medical condition. The patient was treated to the extent that she would allow, and knows that she may return for care at any time. Follow up has been discussed and arranged with Dr. Alejandro Ramsey.
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
## 2022-07-24 NOTE — ED NOTES
Went in to talk to patient and secure her belongings she started cursing at this nurse and the charge nurse came in to help and she started cursing at the charge, went to provider and informed him she was leaving. Patient denies SI/HI denies hallucinations.  Patient took all belongings with her

## 2023-07-20 ENCOUNTER — HOSPITAL ENCOUNTER (INPATIENT)
Facility: HOSPITAL | Age: 53
LOS: 7 days | Discharge: HOME | DRG: 760 | End: 2023-07-27
Attending: PSYCHIATRY & NEUROLOGY | Admitting: PSYCHIATRY & NEUROLOGY
Payer: MEDICAID

## 2023-07-20 DIAGNOSIS — F22 DELUSIONAL IDEAS (HCC): Primary | ICD-10-CM

## 2023-07-20 DIAGNOSIS — Z00.8 MEDICAL CLEARANCE FOR PSYCHIATRIC ADMISSION: ICD-10-CM

## 2023-07-20 DIAGNOSIS — F99 PSYCHIATRIC DISTURBANCE: ICD-10-CM

## 2023-07-20 DIAGNOSIS — F22 PARANOID IDEATION (HCC): ICD-10-CM

## 2023-07-20 PROBLEM — F29 UNSPECIFIED PSYCHOSIS NOT DUE TO A SUBSTANCE OR KNOWN PHYSIOLOGICAL CONDITION (HCC): Status: ACTIVE | Noted: 2023-07-20

## 2023-07-20 LAB
ALBUMIN SERPL-MCNC: 4 G/DL (ref 3.5–5)
ALBUMIN/GLOB SERPL: 1.5 (ref 1.1–2.2)
ALP SERPL-CCNC: 71 U/L (ref 45–117)
ALT SERPL-CCNC: 14 U/L (ref 12–78)
AMPHET UR QL SCN: NEGATIVE
ANION GAP SERPL CALC-SCNC: 9 MMOL/L (ref 5–15)
APPEARANCE UR: ABNORMAL
AST SERPL W P-5'-P-CCNC: 7 U/L (ref 15–37)
BACTERIA URNS QL MICRO: ABNORMAL /HPF
BARBITURATES UR QL SCN: NEGATIVE
BASOPHILS # BLD: 0 K/UL (ref 0–0.1)
BASOPHILS NFR BLD: 1 % (ref 0–1)
BENZODIAZ UR QL: NEGATIVE
BILIRUB SERPL-MCNC: 0.7 MG/DL (ref 0.2–1)
BILIRUB UR QL CFM: NEGATIVE
BILIRUB UR QL: ABNORMAL
BUN SERPL-MCNC: 11 MG/DL (ref 6–20)
BUN/CREAT SERPL: 22 (ref 12–20)
CA-I BLD-MCNC: 9.3 MG/DL (ref 8.5–10.1)
CANNABINOIDS UR QL SCN: POSITIVE
CHLORIDE SERPL-SCNC: 111 MMOL/L (ref 97–108)
CO2 SERPL-SCNC: 22 MMOL/L (ref 21–32)
COCAINE UR QL SCN: NEGATIVE
COLOR UR: YELLOW
CREAT SERPL-MCNC: 0.51 MG/DL (ref 0.55–1.02)
DIFFERENTIAL METHOD BLD: NORMAL
EOSINOPHIL # BLD: 0.1 K/UL (ref 0–0.4)
EOSINOPHIL NFR BLD: 2 % (ref 0–7)
EPITH CASTS URNS QL MICRO: ABNORMAL /LPF
ERYTHROCYTE [DISTWIDTH] IN BLOOD BY AUTOMATED COUNT: 14.2 % (ref 11.5–14.5)
ETHANOL SERPL-MCNC: <10 MG/DL (ref 0–0.08)
FLUAV RNA SPEC QL NAA+PROBE: NOT DETECTED
FLUBV RNA SPEC QL NAA+PROBE: NOT DETECTED
GLOBULIN SER CALC-MCNC: 2.7 G/DL (ref 2–4)
GLUCOSE SERPL-MCNC: 99 MG/DL (ref 65–100)
GLUCOSE UR STRIP.AUTO-MCNC: NEGATIVE MG/DL
HCT VFR BLD AUTO: 45.2 % (ref 35–47)
HGB BLD-MCNC: 15.2 G/DL (ref 11.5–16)
HGB UR QL STRIP: NEGATIVE
IMM GRANULOCYTES # BLD AUTO: 0 K/UL (ref 0–0.04)
IMM GRANULOCYTES NFR BLD AUTO: 0 % (ref 0–0.5)
KETONES UR QL STRIP.AUTO: 20 MG/DL
LEUKOCYTE ESTERASE UR QL STRIP.AUTO: NEGATIVE
LYMPHOCYTES # BLD: 2.4 K/UL (ref 0.8–3.5)
LYMPHOCYTES NFR BLD: 39 % (ref 12–49)
Lab: ABNORMAL
MCH RBC QN AUTO: 32.7 PG (ref 26–34)
MCHC RBC AUTO-ENTMCNC: 33.6 G/DL (ref 30–36.5)
MCV RBC AUTO: 97.2 FL (ref 80–99)
METHADONE UR QL: NEGATIVE
MONOCYTES # BLD: 0.3 K/UL (ref 0–1)
MONOCYTES NFR BLD: 5 % (ref 5–13)
MUCOUS THREADS URNS QL MICRO: ABNORMAL /LPF
NEUTS SEG # BLD: 3.3 K/UL (ref 1.8–8)
NEUTS SEG NFR BLD: 53 % (ref 32–75)
NITRITE UR QL STRIP.AUTO: NEGATIVE
NRBC # BLD: 0 K/UL (ref 0–0.01)
NRBC BLD-RTO: 0 PER 100 WBC
OPIATES UR QL: NEGATIVE
OTHER: ABNORMAL
PCP UR QL: NEGATIVE
PH UR STRIP: 5 (ref 5–8)
PLATELET # BLD AUTO: 238 K/UL (ref 150–400)
PMV BLD AUTO: 10.2 FL (ref 8.9–12.9)
POTASSIUM SERPL-SCNC: 3.6 MMOL/L (ref 3.5–5.1)
PROT SERPL-MCNC: 6.7 G/DL (ref 6.4–8.2)
PROT UR STRIP-MCNC: 30 MG/DL
RBC # BLD AUTO: 4.65 M/UL (ref 3.8–5.2)
RBC #/AREA URNS HPF: ABNORMAL /HPF (ref 0–5)
SARS-COV-2 RNA RESP QL NAA+PROBE: NOT DETECTED
SODIUM SERPL-SCNC: 142 MMOL/L (ref 136–145)
SP GR UR REFRACTOMETRY: 1.03 (ref 1–1.03)
URINE CULTURE IF INDICATED: ABNORMAL
UROBILINOGEN UR QL STRIP.AUTO: 4 EU/DL (ref 0.1–1)
WBC # BLD AUTO: 6.1 K/UL (ref 3.6–11)
WBC URNS QL MICRO: ABNORMAL /HPF (ref 0–4)

## 2023-07-20 PROCEDURE — 6360000002 HC RX W HCPCS: Performed by: NURSE PRACTITIONER

## 2023-07-20 PROCEDURE — 99285 EMERGENCY DEPT VISIT HI MDM: CPT

## 2023-07-20 PROCEDURE — 36415 COLL VENOUS BLD VENIPUNCTURE: CPT

## 2023-07-20 PROCEDURE — 96372 THER/PROPH/DIAG INJ SC/IM: CPT

## 2023-07-20 PROCEDURE — 85025 COMPLETE CBC W/AUTO DIFF WBC: CPT

## 2023-07-20 PROCEDURE — 80053 COMPREHEN METABOLIC PANEL: CPT

## 2023-07-20 PROCEDURE — 81001 URINALYSIS AUTO W/SCOPE: CPT

## 2023-07-20 PROCEDURE — 87636 SARSCOV2 & INF A&B AMP PRB: CPT

## 2023-07-20 PROCEDURE — 80307 DRUG TEST PRSMV CHEM ANLYZR: CPT

## 2023-07-20 PROCEDURE — 6370000000 HC RX 637 (ALT 250 FOR IP): Performed by: NURSE PRACTITIONER

## 2023-07-20 PROCEDURE — 2580000003 HC RX 258: Performed by: NURSE PRACTITIONER

## 2023-07-20 PROCEDURE — 82077 ASSAY SPEC XCP UR&BREATH IA: CPT

## 2023-07-20 PROCEDURE — 1240000000 HC EMOTIONAL WELLNESS R&B

## 2023-07-20 RX ORDER — HALOPERIDOL 5 MG/ML
5 INJECTION INTRAMUSCULAR EVERY 4 HOURS PRN
Status: DISCONTINUED | OUTPATIENT
Start: 2023-07-20 | End: 2023-07-27 | Stop reason: HOSPADM

## 2023-07-20 RX ORDER — POLYETHYLENE GLYCOL 3350 17 G
2 POWDER IN PACKET (EA) ORAL
Status: DISCONTINUED | OUTPATIENT
Start: 2023-07-20 | End: 2023-07-20 | Stop reason: CLARIF

## 2023-07-20 RX ORDER — HYDROXYZINE HYDROCHLORIDE 25 MG/1
25 TABLET, FILM COATED ORAL
Status: COMPLETED | OUTPATIENT
Start: 2023-07-20 | End: 2023-07-20

## 2023-07-20 RX ORDER — HALOPERIDOL 5 MG/1
5 TABLET ORAL EVERY 4 HOURS PRN
Status: DISCONTINUED | OUTPATIENT
Start: 2023-07-20 | End: 2023-07-27 | Stop reason: HOSPADM

## 2023-07-20 RX ORDER — POLYETHYLENE GLYCOL 3350 17 G/17G
17 POWDER, FOR SOLUTION ORAL DAILY PRN
Status: DISCONTINUED | OUTPATIENT
Start: 2023-07-20 | End: 2023-07-27 | Stop reason: HOSPADM

## 2023-07-20 RX ORDER — DIPHENHYDRAMINE HYDROCHLORIDE 50 MG/ML
50 INJECTION INTRAMUSCULAR; INTRAVENOUS EVERY 4 HOURS PRN
Status: DISCONTINUED | OUTPATIENT
Start: 2023-07-20 | End: 2023-07-27 | Stop reason: HOSPADM

## 2023-07-20 RX ORDER — HYDROXYZINE 50 MG/1
50 TABLET, FILM COATED ORAL 3 TIMES DAILY PRN
Status: DISCONTINUED | OUTPATIENT
Start: 2023-07-20 | End: 2023-07-21 | Stop reason: SDUPTHER

## 2023-07-20 RX ORDER — TRAZODONE HYDROCHLORIDE 50 MG/1
50 TABLET ORAL NIGHTLY PRN
Status: DISCONTINUED | OUTPATIENT
Start: 2023-07-20 | End: 2023-07-27 | Stop reason: HOSPADM

## 2023-07-20 RX ORDER — ACETAMINOPHEN 325 MG/1
650 TABLET ORAL EVERY 4 HOURS PRN
Status: DISCONTINUED | OUTPATIENT
Start: 2023-07-20 | End: 2023-07-27 | Stop reason: HOSPADM

## 2023-07-20 RX ADMIN — HYDROXYZINE HYDROCHLORIDE 25 MG: 25 TABLET, FILM COATED ORAL at 14:06

## 2023-07-20 RX ADMIN — ZIPRASIDONE MESYLATE 20 MG: 20 INJECTION, POWDER, LYOPHILIZED, FOR SOLUTION INTRAMUSCULAR at 17:13

## 2023-07-20 ASSESSMENT — LIFESTYLE VARIABLES
HOW MANY STANDARD DRINKS CONTAINING ALCOHOL DO YOU HAVE ON A TYPICAL DAY: 5 OR 6
HOW OFTEN DO YOU HAVE A DRINK CONTAINING ALCOHOL: MONTHLY OR LESS

## 2023-07-20 ASSESSMENT — PAIN SCALES - GENERAL: PAINLEVEL_OUTOF10: 6

## 2023-07-20 ASSESSMENT — PAIN - FUNCTIONAL ASSESSMENT
PAIN_FUNCTIONAL_ASSESSMENT: 0-10
PAIN_FUNCTIONAL_ASSESSMENT: NONE - DENIES PAIN

## 2023-07-20 ASSESSMENT — SLEEP AND FATIGUE QUESTIONNAIRES
DO YOU USE A SLEEP AID: NO
DO YOU HAVE DIFFICULTY SLEEPING: NO
AVERAGE NUMBER OF SLEEP HOURS: 5
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
## 2023-07-20 NOTE — ED NOTES
CALLED REPORT TO RBOIN MCDERMOTT. TDO HAS NOT ARRIVED AT THIS TIME.       Everardo Traylor RN  07/20/23 7618 Donor Site Anesthesia Type: same as repair anesthesia

## 2023-07-20 NOTE — ED TRIAGE NOTES
Pt is very emotional during triage. Pt using word salads during triage asking not to be locked up and stating she doesn't want to go to central [facility].

## 2023-07-20 NOTE — ED PROVIDER NOTES
[facility] EMERGENCY DEPT  EMERGENCY DEPARTMENT HISTORY AND PHYSICAL EXAM      Date: 7/20/2023  Patient Name: Lala Reina  MRN: [id number]  [address] 1970  Date of evaluation: 7/20/2023  Provider: DEREK Garcia NP   Note Started: 9:28 AM EDT 7/20/23    HISTORY OF PRESENT ILLNESS     Chief Complaint   Patient presents with    Mental Health Problem       History Provided By: Patient    HPI: Lala Reina is a 46 y.o. female with past medical history significant for psychiatric disorder and other history reviewed as listed below presents crying, disheveled stating that she is sleep deprived but has been sleeping in her car for the past few days. She has disorganized thoughts, labile emotions, flight of ideas, stating that she is being cyber stalked as well as followed by the FBI. She is exhibiting emotional distress and hysterically crying less limiting her HPI. She does not appear to have any injuries and when she is able to be calmed down she denies any pain. PAST MEDICAL HISTORY   Past Medical History:  Past Medical History:   Diagnosis Date    Adverse effect of anesthesia     after 2011 D+C/ablation, pt reports low heart rate during surgery, admitted overnight for observation. no further problems.  pt reports no problems surgeries since    Fibromyalgia     GERD (gastroesophageal reflux disease)     H/O seasonal allergies     Migraine     ROSALBA on CPAP     compliant    PTSD (post-traumatic stress disorder)     Sinus pressure     fluid/pain in ears       Past Surgical History:  Past Surgical History:   Procedure Laterality Date    CARPAL TUNNEL RELEASE      2015    CHOLECYSTECTOMY, LAPAROSCOPIC  2015    CYST REMOVAL      inclusion cyst removed from back at [address]  2011 and 2014    ablation x2    KNEE ARTHROSCOPY Right     MYRINGOTOMY Bilateral 2020    with tubes    SHOULDER ARTHROSCOPY Right 2020    RCR    TONSILLECTOMY  age 5    TUBAL LIGATION      TYMPANOSTOMY TUBE

## 2023-07-21 PROCEDURE — 1240000000 HC EMOTIONAL WELLNESS R&B

## 2023-07-21 PROCEDURE — 6370000000 HC RX 637 (ALT 250 FOR IP): Performed by: PSYCHIATRY & NEUROLOGY

## 2023-07-21 PROCEDURE — 6360000002 HC RX W HCPCS: Performed by: PSYCHIATRY & NEUROLOGY

## 2023-07-21 RX ORDER — LORAZEPAM 2 MG/ML
1 INJECTION INTRAMUSCULAR EVERY 4 HOURS PRN
Status: DISCONTINUED | OUTPATIENT
Start: 2023-07-21 | End: 2023-07-22

## 2023-07-21 RX ORDER — CLONAZEPAM 0.5 MG/1
0.5 TABLET ORAL DAILY PRN
Status: DISCONTINUED | OUTPATIENT
Start: 2023-07-21 | End: 2023-07-22

## 2023-07-21 RX ORDER — ZIPRASIDONE HYDROCHLORIDE 20 MG/1
20 CAPSULE ORAL EVERY 12 HOURS PRN
Status: DISCONTINUED | OUTPATIENT
Start: 2023-07-21 | End: 2023-07-27 | Stop reason: HOSPADM

## 2023-07-21 RX ORDER — ERGOCALCIFEROL 1.25 MG/1
50000 CAPSULE ORAL WEEKLY
Status: DISCONTINUED | OUTPATIENT
Start: 2023-07-22 | End: 2023-07-27 | Stop reason: HOSPADM

## 2023-07-21 RX ORDER — HYDROXYZINE 50 MG/1
50 TABLET, FILM COATED ORAL 3 TIMES DAILY PRN
Status: DISCONTINUED | OUTPATIENT
Start: 2023-07-21 | End: 2023-07-22

## 2023-07-21 RX ORDER — ONDANSETRON 4 MG/1
4 TABLET, ORALLY DISINTEGRATING ORAL EVERY 8 HOURS PRN
Status: DISCONTINUED | OUTPATIENT
Start: 2023-07-21 | End: 2023-07-27 | Stop reason: HOSPADM

## 2023-07-21 RX ORDER — LORAZEPAM 1 MG/1
1 TABLET ORAL EVERY 4 HOURS PRN
Status: DISCONTINUED | OUTPATIENT
Start: 2023-07-21 | End: 2023-07-22

## 2023-07-21 RX ORDER — DULOXETIN HYDROCHLORIDE 30 MG/1
60 CAPSULE, DELAYED RELEASE ORAL DAILY
Status: DISCONTINUED | OUTPATIENT
Start: 2023-07-21 | End: 2023-07-27 | Stop reason: HOSPADM

## 2023-07-21 RX ORDER — SUMATRIPTAN 25 MG/1
100 TABLET, FILM COATED ORAL EVERY 12 HOURS PRN
Status: DISCONTINUED | OUTPATIENT
Start: 2023-07-21 | End: 2023-07-24

## 2023-07-21 RX ORDER — GABAPENTIN 100 MG/1
100 CAPSULE ORAL EVERY MORNING
Status: DISCONTINUED | OUTPATIENT
Start: 2023-07-21 | End: 2023-07-27 | Stop reason: HOSPADM

## 2023-07-21 RX ORDER — GABAPENTIN 100 MG/1
200 CAPSULE ORAL NIGHTLY
Status: DISCONTINUED | OUTPATIENT
Start: 2023-07-21 | End: 2023-07-27 | Stop reason: HOSPADM

## 2023-07-21 RX ORDER — TOPIRAMATE 100 MG/1
200 TABLET, FILM COATED ORAL 2 TIMES DAILY
Status: DISCONTINUED | OUTPATIENT
Start: 2023-07-21 | End: 2023-07-27 | Stop reason: HOSPADM

## 2023-07-21 RX ORDER — ZIPRASIDONE HYDROCHLORIDE 20 MG/1
20 CAPSULE ORAL 2 TIMES DAILY WITH MEALS
Status: DISCONTINUED | OUTPATIENT
Start: 2023-07-21 | End: 2023-07-24

## 2023-07-21 RX ORDER — ZIPRASIDONE MESYLATE 20 MG/ML
20 INJECTION, POWDER, LYOPHILIZED, FOR SOLUTION INTRAMUSCULAR EVERY 12 HOURS PRN
Status: DISCONTINUED | OUTPATIENT
Start: 2023-07-21 | End: 2023-07-27 | Stop reason: HOSPADM

## 2023-07-21 RX ADMIN — GABAPENTIN 200 MG: 100 CAPSULE ORAL at 20:51

## 2023-07-21 RX ADMIN — DULOXETINE HYDROCHLORIDE 60 MG: 30 CAPSULE, DELAYED RELEASE ORAL at 13:15

## 2023-07-21 RX ADMIN — LORAZEPAM 1 MG: 2 INJECTION INTRAMUSCULAR; INTRAVENOUS at 09:46

## 2023-07-21 RX ADMIN — TOPIRAMATE 200 MG: 100 TABLET, FILM COATED ORAL at 13:14

## 2023-07-21 RX ADMIN — BUSPIRONE HYDROCHLORIDE 15 MG: 10 TABLET ORAL at 20:51

## 2023-07-21 RX ADMIN — GABAPENTIN 100 MG: 100 CAPSULE ORAL at 13:15

## 2023-07-21 RX ADMIN — BUSPIRONE HYDROCHLORIDE 15 MG: 10 TABLET ORAL at 13:14

## 2023-07-21 RX ADMIN — ZIPRASIDONE MESYLATE 20 MG: 20 INJECTION, POWDER, LYOPHILIZED, FOR SOLUTION INTRAMUSCULAR at 09:52

## 2023-07-21 RX ADMIN — TOPIRAMATE 200 MG: 100 TABLET, FILM COATED ORAL at 20:51

## 2023-07-21 NOTE — CARE COORDINATION
07/21/23 1046   Short Term Goal 2   Target Pt will demonstrate positive and appropriate communication and boudaries with staff and peers   Objectives Client will participate in individual therapy;Client will participate in group therapy   Intervention 1 Acknowledge client strengths   Frequency daily   Measured by Self report   Staff Responsible [facility] staff   Intervention 2 Indvidual therapy  (Pt will be practice communication skills)   Frequency daily   Measured by Behavioral data; Self report;Staff observation   Staff Responsible [facility] staff   Intervention 3 Group therapy  (Pt will be observed and participate in group without disruption)   Frequency daily   Measured by Behavioral data; Self report;Staff observation   Staff Responsible [facility] staff   STG Goal 2 Status: Patient Appears to be  Progressing toward treatment plan goal   Crisis/Safety/Discharge Plan   Crisis/Safety Plan Standard program interventions and protocol   Comprehensive Assessment Completion Date 07/21/23   Discharge Plan to stabilize pt and outpatient resources will be coordinated

## 2023-07-21 NOTE — BH NOTE
Patient sitting in the dayroom socializing amongst peers and is noted to be loud, sarcastic, hyperverbal, manicy, and complaining about food and the care to her peers. Patient was overheard saying, \"they don't care about us because we are on the psych floor. \" Patient is noted to be intrusive. Dr. Monica Amaya was notified for medication orders as this patient did not have any scheduled medications ordered. Orders received for Geodon 20mg BID and also Geodon 20mg IM/PO every 12 hours as needed, and Ativan 1mg PO/IM every 4 hours as needed. This writer approached patient for scheduled medication and a PRN Ativan and patient was sitting in the dayroom talking to another peer. Patient stated, \"yep, here we go, they getting ready to drug me up\" and walked out to talk to this writer in the hallway. Patient was educated about Geodon and PRN  Ativan which she declined both and stated \"she does not take that medication. \" Patient presented as very sarcastic and smiling inappropriately and stated, Demetria Foster does not take those medications. \" Patient stated that she takes \"gabapentin, topamax, and something else. \" Patient stated that she used [facility] in [place]. Patient declined the geodon and ativan and stated, \"yep, I do not take those medications. \" Patient also mentioned that she needed an antibiotic for her urinary infection and stated that she was told in the ER that she had bacteria in her urine. Patient was educated that she would be seen by a medical doctor today and that would be addressed by medical doctor.

## 2023-07-21 NOTE — PLAN OF CARE
Problem: Anxiety  Goal: Will report anxiety at manageable levels  Description: INTERVENTIONS:  1. Administer medication as ordered  2. Teach and rehearse alternative coping skills  3. Provide emotional support with 1:1 interaction with staff  Outcome: Not Progressing     Problem: Coping  Goal: Pt/Family able to verbalize concerns and demonstrate effective coping strategies  Description: INTERVENTIONS:  1. Assist patient/family to identify coping skills, available support systems and cultural and spiritual values  2. Provide emotional support, including active listening and acknowledgement of concerns of patient and caregivers  3. Reduce environmental stimuli, as able  4. Instruct patient/family in relaxation techniques, as appropriate  5. Assess for spiritual pain/suffering and initiate Spiritual Care, Psychosocial Clinical Specialist consults as needed  Outcome: Not Progressing     Problem: Behavior  Goal: Pt/Family maintain appropriate behavior and adhere to behavioral management agreement, if implemented  Description: INTERVENTIONS:  1. Assess patient/family's coping skills and  non-compliant behavior (including use of illegal substances)  2. Notify security of behavior or suspected illegal substances which indicate the need for search of the family and/or belongings  3. Encourage verbalization of thoughts and concerns in a socially appropriate manner  4. Utilize positive, consistent limit setting strategies supporting safety of patient, staff and others  5. Encourage participation in the decision making process about the behavioral management agreement  6. If a visitor's behavior poses a threat to safety call refer to organization policy.   7. Initiate consult with , Psychosocial CNS, Spiritual Care as appropriate  Outcome: Not Progressing     Problem: Involuntary Admit  Goal: Will cooperate with staff recommendations and doctor's orders and will demonstrate appropriate behavior  Description:

## 2023-07-21 NOTE — BH NOTE
ADMISSION NOTE  Patient arrived on the unit at 2030 under a TDO for Bipolar Disorder with Psychotic Features. Patient was initially refusing to interact and sign admission paperwork but eventually agreed after much convincing. She stated she came to the ED for headache but the doctor eventually \"locked me up here in the Psych wallace. \" Oriented to person, place and date/time but refused to acknowledge her manic and aggressive behavior at the ED. She was hyperverbal during this encounter, citing \"[employer] mislabeled my prescription and scammed my insurance,\" cyberstalking me and my social media got hacked. \" She went on and on with these delusional and grandiose statements and had to be redirected back to the topic. She denied SI/HI and AVH but continued to endorse persecutory delusions. Patient is however, oriented and knowledgeable about her medication schedule and doses. She was given a short tour of the unit and eventually settled to her room. Will initiate interdisciplinary plan of care and continue current safety checks.

## 2023-07-21 NOTE — BH NOTE
PATIENT BEHAVIOR:     0930am- Patient loud and disruptive on the unit. Patient demanding her medications despite this writer already talking with her about her medications and explaining to the patient that this writer would have to obtain medication orders and pharmacy would have to review and verify and then I could administer her medications. Patient loud and talks over staff and does not allow opportunity for staff to talk and explain things. Patient did not follow redirection and stood in doorway to the nurses station and would not back away as requested and kept demanding her medication and demanding for us to call her doctor and kept saying that we were not listening to her. Patient was noted to talk to  loudly and demanding and was very intrusive and not able to be redirected. Patient also noted to get in the [facility] face and refused to follow redirection. Patient continued to be loud and disruptive on the unit and continues to be argumentative with staff.     0941am- CODE KAILA called due to patient behavior. [id number]- Patient was given IM Geodon and IM Ativan in left deltoid per request by the patient because she did not want shot in right arm. Hands was was required as patient declined wanting the shots and stated that we were giving her \"our medication and not hers. \" Patient was educated that these medications were ordered by the doctor and necessary at this time. 0132WM- Patient assisted to seclusion room and door shut due to patient continuing to come out her room and continuing to be loud and disruptive to others on the unit. Patient continues to be argumentative. Dr. Clement Lyles notified and orders received for seclusion. 1000am- Patient in seclusion room yelling and hitting. 1132am- Patient cursed at [facility] sitting outside seclusion door and was reported that she told T to \"suck her milo\" and \"go to hell. \"     1210pm- This writer opened seclusion door and placed patient's lunch and

## 2023-07-21 NOTE — BH NOTE
PSA PART II ADDITIONAL INFORMATION        Access To Fire Arms:  Could not be assessed due to pts mental status    Substance Use: Yes    Last Use: 7.20.23    Type of Substance: marijuana    Frequency of Use: daily    Request to See : No    If yes, notified : No    Guardian:No    Guardian Contact:Pt is her own legal guardian    Release of Information Signed: No    Release of Information Signed For:  Could not be assessed due to pts mental status

## 2023-07-21 NOTE — BH NOTE
PSYCHOSOCIAL ASSESSMENT  :Patient identifying info:   Lester Abernathy is a 46 y.o., female admitted 2023  9:15 AM     Presenting problem and precipitating factors: Pt was admitted to the ED with complaints of a headache and fatigue. She presented with paranoid, grandiose thoughts. Pt said that she would like to put white men on an [place] and watch them eat each other. She was guarded when speaking with the [facility] because she was from [facility]. She reported that she was recently dc from the [facility] and they were drugging her and giving her other pt's medications. She said that Lili Fox was re-labeling her medications and giving them to other people. She stated that the [employer] police are paid her off to run her out by people from the Internet. Noted from [place] note     Mental status assessment: Pt presented with an angry, irritable, manic affect and congruent mood. Pt was observed screaming and yelling at staff. Pts thoughts were tangential, paranoid and illogical. Pt stated \"YOU ARE NOT FOLLOWING THE RULES\". Pt continues to blame the hospital for her admission. Pt lacks insight and has poor judgement.      Strengths/Recreation/Coping Skills:Could not be assessed due to pts mental status    Collateral information: Could not be assessed due to pts mental status    Current psychiatric /substance abuse providers and contact info: Dr. April Conklin    Previous psychiatric/substance abuse providers and response to treatment: Pt was recently dc from the [facility]    Family history of mental illness or substance abuse: Could not be assessed due to pts mental status    Substance abuse history:    Social History     Tobacco Use    Smoking status: Former     Packs/day: 1.00     Types: Cigarettes     Quit date: 10/14/2020     Years since quittin.7    Smokeless tobacco: Never   Substance Use Topics    Alcohol use: No       History of biomedical complications associated with substance abuse: Could not be assessed due to pts mental

## 2023-07-21 NOTE — GROUP NOTE
Group Therapy Note    Date: 7/21/2023    Group Start Time: 3942  Group End Time: 1630  Group Topic: Recreational    [facility] 2 BH NON ACUTE    Mehran Diamond        Group Therapy Note    Facilitated leisure skills group to reinforce positive coping and to manage mood through music, social interaction, group activities and art task     Attendees: 6/8       Patient's Goal:  Pt will demonstrate positive and appropriate communication and boudaries with staff and peers     Notes:  Pt was receptive to listening to music and songs she selected. Interacted with peers and staff. Selected art task to work on later     Status After Intervention:  Improved    Participation Level:  Active Listener and Interactive    Participation Quality: Appropriate      Speech:  normal      Thought Process/Content: Logical      Affective Functioning: Congruent      Mood:  Calm      Level of consciousness:  Alert      Response to Learning: Progressing to goal      Endings: None Reported    Modes of Intervention: Socialization and Activity      Discipline Responsible: Recreational Therapist      Signature:  Ame Castillo

## 2023-07-22 PROCEDURE — 6370000000 HC RX 637 (ALT 250 FOR IP): Performed by: FAMILY MEDICINE

## 2023-07-22 PROCEDURE — 6370000000 HC RX 637 (ALT 250 FOR IP): Performed by: PSYCHIATRY & NEUROLOGY

## 2023-07-22 PROCEDURE — 87086 URINE CULTURE/COLONY COUNT: CPT

## 2023-07-22 PROCEDURE — 1240000000 HC EMOTIONAL WELLNESS R&B

## 2023-07-22 RX ORDER — CLONAZEPAM 0.5 MG/1
0.5 TABLET ORAL 3 TIMES DAILY PRN
Status: DISCONTINUED | OUTPATIENT
Start: 2023-07-22 | End: 2023-07-22

## 2023-07-22 RX ORDER — HYDROXYZINE 50 MG/1
50 TABLET, FILM COATED ORAL 3 TIMES DAILY PRN
Status: DISCONTINUED | OUTPATIENT
Start: 2023-07-22 | End: 2023-07-27 | Stop reason: HOSPADM

## 2023-07-22 RX ORDER — CLONAZEPAM 0.5 MG/1
0.5 TABLET ORAL 3 TIMES DAILY PRN
Status: DISCONTINUED | OUTPATIENT
Start: 2023-07-22 | End: 2023-07-27 | Stop reason: HOSPADM

## 2023-07-22 RX ORDER — CIPROFLOXACIN 500 MG/1
500 TABLET, FILM COATED ORAL EVERY 12 HOURS SCHEDULED
Status: DISCONTINUED | OUTPATIENT
Start: 2023-07-22 | End: 2023-07-27 | Stop reason: HOSPADM

## 2023-07-22 RX ORDER — LOPERAMIDE HYDROCHLORIDE 2 MG/1
2 CAPSULE ORAL 4 TIMES DAILY PRN
Status: DISCONTINUED | OUTPATIENT
Start: 2023-07-22 | End: 2023-07-27 | Stop reason: HOSPADM

## 2023-07-22 RX ORDER — LORAZEPAM 2 MG/ML
1 INJECTION INTRAMUSCULAR EVERY 4 HOURS PRN
Status: DISCONTINUED | OUTPATIENT
Start: 2023-07-22 | End: 2023-07-27 | Stop reason: HOSPADM

## 2023-07-22 RX ADMIN — HYDROXYZINE HYDROCHLORIDE 50 MG: 50 TABLET, FILM COATED ORAL at 17:46

## 2023-07-22 RX ADMIN — CIPROFLOXACIN 500 MG: 500 TABLET, FILM COATED ORAL at 11:20

## 2023-07-22 RX ADMIN — LORAZEPAM 1 MG: 1 TABLET ORAL at 04:41

## 2023-07-22 RX ADMIN — TOPIRAMATE 200 MG: 100 TABLET, FILM COATED ORAL at 21:10

## 2023-07-22 RX ADMIN — TOPIRAMATE 200 MG: 100 TABLET, FILM COATED ORAL at 08:21

## 2023-07-22 RX ADMIN — CLONAZEPAM 0.5 MG: 0.5 TABLET ORAL at 21:18

## 2023-07-22 RX ADMIN — HYDROXYZINE HYDROCHLORIDE 50 MG: 50 TABLET, FILM COATED ORAL at 10:51

## 2023-07-22 RX ADMIN — ONDANSETRON 4 MG: 4 TABLET, ORALLY DISINTEGRATING ORAL at 09:40

## 2023-07-22 RX ADMIN — GABAPENTIN 100 MG: 100 CAPSULE ORAL at 08:22

## 2023-07-22 RX ADMIN — BUSPIRONE HYDROCHLORIDE 15 MG: 10 TABLET ORAL at 21:10

## 2023-07-22 RX ADMIN — HYDROXYZINE HYDROCHLORIDE 50 MG: 50 TABLET, FILM COATED ORAL at 02:17

## 2023-07-22 RX ADMIN — LOPERAMIDE HYDROCHLORIDE 2 MG: 2 CAPSULE ORAL at 19:58

## 2023-07-22 RX ADMIN — GABAPENTIN 200 MG: 100 CAPSULE ORAL at 21:10

## 2023-07-22 RX ADMIN — ERGOCALCIFEROL 50000 UNITS: 1.25 CAPSULE ORAL at 08:22

## 2023-07-22 RX ADMIN — BUSPIRONE HYDROCHLORIDE 15 MG: 10 TABLET ORAL at 08:21

## 2023-07-22 RX ADMIN — CIPROFLOXACIN 500 MG: 500 TABLET, FILM COATED ORAL at 21:10

## 2023-07-22 RX ADMIN — ACETAMINOPHEN 650 MG: 325 TABLET ORAL at 04:41

## 2023-07-22 RX ADMIN — DULOXETINE HYDROCHLORIDE 60 MG: 30 CAPSULE, DELAYED RELEASE ORAL at 08:21

## 2023-07-22 ASSESSMENT — PAIN SCALES - GENERAL: PAINLEVEL_OUTOF10: 8

## 2023-07-22 ASSESSMENT — PAIN DESCRIPTION - ORIENTATION: ORIENTATION: RIGHT

## 2023-07-22 ASSESSMENT — PAIN DESCRIPTION - LOCATION: LOCATION: HEAD

## 2023-07-22 ASSESSMENT — PAIN DESCRIPTION - DESCRIPTORS: DESCRIPTORS: POUNDING

## 2023-07-22 NOTE — BH NOTE
Patient loudly talking to herself while eating dinner this evening, started an argument with male peer who was watching TV, and could not hear. Patient then argued with security, MHT and Nurses, not accepting responsibility that she was causing a problem. Patient refused evening geodon, ativan and atarax, initially, then accepted atarax. Dr. Michell Jean notified and orders were received for 0.5 mg clonopin TID PRN.

## 2023-07-22 NOTE — GROUP NOTE
Group Therapy Note    Date: 7/22/2023    Group Start Time: 1115  Group End Time: 1200  Group Topic: Education Group - Inpatient    [facility] [address] [facility] NON ACUTE    Anle Galloway        Group Therapy Note    Attendees: 8/9     Facilitated structured group to increase awareness of triggers and encourage and to explore places, people and situations that cause triggers and positive ways to manage triggers. Patient's Goal:  Pt/Family able to verbalize concerns and demonstrate effective coping strategies    Notes: Attended group. Pt unable to focus on topic. Became verbally aggressive. Verbalizing her triggers as the events that led up to her admission to the behavioral health unit. Pt yelling at [facility], giving middle finger to staff. Left group, later returned initially sat quietly in group but again became elevated and verbalizing everyone in group had no resources for their problems which was ultimately not having any money. Pt encouraged to focus on herself and not ask for personal information from peers. Pt left group and did not return. Status After Intervention:  Decompensated    Participation Level: Monopolizing    Participation Quality: Inappropriate and Intrusive      Speech:  pressured and loud      Thought Process/Content: Delusional  Flight of ideas  Perseverating      Affective Functioning: Exaggerated      Mood: angry and irritable      Level of consciousness:  Preoccupied      Response to Learning: Resistant      Endings: None Reported    Modes of Intervention: Education and Support      Discipline Responsible: Recreational Therapist      Signature:   SRIDHAR LealS

## 2023-07-22 NOTE — BH NOTE
Patient spent time at beginning of shift recounting events from earlier today. Patient blames \"this [facility]\" for lying to her. Patient states she is here without consent and her doctor is to be called. Requesting cell phone on unit to get additional numbers to call. Patient told that was not an option tonight. Patient states \"my urine is burning like hell\" and also states \"I'm shiting on myself\"  Order phoned for prn for diarrhea and a C&S. Patient made aware and refused the prn for diarrhea. Patient requested prn Atarax and Maxalt. Patient given atarax but refused substitute for Maxalt which is Imitrex. Patient states \"oh there it is again my meds being messed up\" \"I dont take Imitrex it makes me sick, so put that on my allergy list\" Patient states Imitrex  makes her have diarrhea. Pt returns to bed.

## 2023-07-22 NOTE — H&P
Benadryl 50 mg p.r.n.  4.  Duloxetine 90 mg daily. 5.  Gabapentin 100 mg every morning, 200 mg nightly. 6.  Haldol 5 mg q.4 h. p.r.n.  7.  Haloperidol injection 5 mg q.4 h. p.r.n. Need to be stabilized with mood stabilizer and antipsychotic. The patient is very manicky, hyper, paranoid. Needs stabilization before getting into .         Berta Cody MD      [id number]  D:  07/22/2023 0:36  T:  07/22/2023 5:51  JOB #:  [id number]

## 2023-07-22 NOTE — BH NOTE
Patient pleasant and cooperative, hypo manic, dramatic, denied depression, SI, HI, AH, and VH. Patient renaming staff off [facility]. Stating \"I'm a goddess. \"  Patient remains on close observation, Q 15 minute checks.

## 2023-07-22 NOTE — GROUP NOTE
Group Therapy Note    Date: 7/22/2023    Group Start Time: 0071  Group End Time: 1600  Group Topic: Recreational    [facility] [address] [facility]        Group Therapy Note    Attendees: 6/9    Recreational Therapist facilitated structured leisure skills group to introduce healthy leisure skills as positive way to cope and manage mood. Patient's Goal:  Pt/Family able to verbalize concerns and demonstrate effective coping strategies    Notes: Attended group and listened to songs with peers. Was receptive to intervention and responded to prompts from staff. Attended entire session and was attentive during group. Status After Intervention:  Improved    Participation Level: Active Listener    Participation Quality: Attentive      Speech:  normal      Thought Process/Content: Logical      Affective Functioning: Congruent      Mood:  calm      Level of consciousness:  Alert      Response to Learning: Progressing to goal      Endings: None Reported    Modes of Intervention: Activity      Discipline Responsible: Recreational Therapist      Signature:   EFRAÍN Byrne

## 2023-07-22 NOTE — BH NOTE
Patient argumentative, intrusive, loud, accusing others that they have a problem while denying she has a problem after arguing with guard and a male peer about how loud the TV was and her trying to talk over the TV. Patient paranoid, stated  I am being cyber stalked, the FBI is aware, she only came in here to cool off and to hide out as she was getting chased by the police. Patient refused geodon this morning. Atarax given for c/o anxiety. Patient not able to follow directions this morning after stating the patients were not allowed in other patients rooms, continued to go into new patients room, telling writer \"You all make it impossible to comfort one another. \"  Telling Farhana Downs that this other patient was tired but was scared to lay down, other patient collaborated this and stated she wanted her as a room mate. Abi Pimentel moved into [address] Patient came to report her room mate was now asleep.

## 2023-07-23 ENCOUNTER — APPOINTMENT (OUTPATIENT)
Facility: HOSPITAL | Age: 53
DRG: 760 | End: 2023-07-23
Payer: MEDICAID

## 2023-07-23 LAB
BACTERIA SPEC CULT: NORMAL
COLONY COUNT, CNT: NORMAL
COLONY COUNT, CNT: NORMAL
Lab: NORMAL

## 2023-07-23 PROCEDURE — 1240000000 HC EMOTIONAL WELLNESS R&B

## 2023-07-23 PROCEDURE — 6370000000 HC RX 637 (ALT 250 FOR IP): Performed by: PSYCHIATRY & NEUROLOGY

## 2023-07-23 PROCEDURE — 6370000000 HC RX 637 (ALT 250 FOR IP): Performed by: FAMILY MEDICINE

## 2023-07-23 PROCEDURE — 74176 CT ABD & PELVIS W/O CONTRAST: CPT

## 2023-07-23 RX ADMIN — ONDANSETRON 4 MG: 4 TABLET, ORALLY DISINTEGRATING ORAL at 09:48

## 2023-07-23 RX ADMIN — DULOXETINE HYDROCHLORIDE 60 MG: 30 CAPSULE, DELAYED RELEASE ORAL at 08:37

## 2023-07-23 RX ADMIN — CLONAZEPAM 0.5 MG: 0.5 TABLET ORAL at 14:47

## 2023-07-23 RX ADMIN — GABAPENTIN 100 MG: 100 CAPSULE ORAL at 08:37

## 2023-07-23 RX ADMIN — ACETAMINOPHEN 650 MG: 325 TABLET ORAL at 06:30

## 2023-07-23 RX ADMIN — CIPROFLOXACIN 500 MG: 500 TABLET, FILM COATED ORAL at 21:13

## 2023-07-23 RX ADMIN — ZIPRASIDONE HYDROCHLORIDE 20 MG: 20 CAPSULE ORAL at 16:40

## 2023-07-23 RX ADMIN — BUSPIRONE HYDROCHLORIDE 15 MG: 10 TABLET ORAL at 21:13

## 2023-07-23 RX ADMIN — HYDROXYZINE HYDROCHLORIDE 50 MG: 50 TABLET, FILM COATED ORAL at 08:42

## 2023-07-23 RX ADMIN — CIPROFLOXACIN 500 MG: 500 TABLET, FILM COATED ORAL at 08:37

## 2023-07-23 RX ADMIN — GABAPENTIN 200 MG: 100 CAPSULE ORAL at 21:13

## 2023-07-23 RX ADMIN — BUSPIRONE HYDROCHLORIDE 15 MG: 10 TABLET ORAL at 08:37

## 2023-07-23 RX ADMIN — SUMATRIPTAN SUCCINATE 100 MG: 25 TABLET ORAL at 00:54

## 2023-07-23 RX ADMIN — ACETAMINOPHEN 650 MG: 325 TABLET ORAL at 14:47

## 2023-07-23 RX ADMIN — TOPIRAMATE 200 MG: 100 TABLET, FILM COATED ORAL at 21:13

## 2023-07-23 RX ADMIN — TOPIRAMATE 200 MG: 100 TABLET, FILM COATED ORAL at 08:37

## 2023-07-23 ASSESSMENT — PAIN DESCRIPTION - LOCATION: LOCATION: HEAD

## 2023-07-23 ASSESSMENT — PAIN SCALES - GENERAL
PAINLEVEL_OUTOF10: 9
PAINLEVEL_OUTOF10: 5

## 2023-07-23 NOTE — BH NOTE
Patient related to writer  that \"I know things that I'm being chased for. \"  \"I'm sinking. \"  \"Every time I tell someone I get deeper. \"  Patient unable to reach people she needed to. Writer informed patient she would leave a message with her  to come talk with her. Message left for Nino Seymour.

## 2023-07-23 NOTE — BH NOTE
Patient room mate came to the nurses station, informed writer,that patient was vomiting,  patient found leaning over toilet actively vomiting partially digested food. Patient given zofran sublingual.  Patient then sat in the floor c/o right lower quad abdominal pain, rating at a 10, saying it is a kidney stone. Dr. Yury Chavarria notified and orders received for a stat CT of abdomin without contrast.  Patient taken to CT via wheelchair by T.

## 2023-07-23 NOTE — BH NOTE
Patient pleasant and cooperative, medication compliant, denied SI, HI, AH, VH, depression, and anxiety. Patient remains on close observation, Q 15 minute checks.

## 2023-07-23 NOTE — GROUP NOTE
Group Therapy Note    Date: 7/23/2023    Group Start Time: 1325  Group End Time: 1600  Group Topic: Relaxation    [facility] 2 BH NON ACUTE    Danae Royal        Group Therapy Note    Attendees: 6/10    Facilitated structured relaxation group as positive way to cope and manage mood       Patient's Goal:   Pt/Family able to verbalize concerns and demonstrate effective coping strategies    Notes: Attended group and actively participated. Was receptive to intervention. Verbalized group was positive way to relax. Verbalized enjoyment. Status After Intervention:  Improved    Participation Level: Active Listener    Participation Quality: Attentive      Speech:  normal      Thought Process/Content: Logical      Affective Functioning: Congruent      Mood:  calm       Level of consciousness:  Alert      Response to Learning: Progressing to goal      Endings: None Reported    Modes of Intervention: Activity      Discipline Responsible: Recreational Therapist      Signature:   EFRAÍN Castro

## 2023-07-23 NOTE — BH NOTE
Patient remains on close observation. Patient has been alert, oriented, mostly cooperative, mostly pleasant, occasionally unpleasant, blaming, and condescending towards staff. Patient has been up on the unit. Patient was initially hyper-talkative and happily hypomanic. She asked to speak  with the nurse and she was told the nurse would see her after making a 15-minute round. Patient accused staff of putting her off. She later asked for Imodium and was told it would be brought to her. She then stated she did not want it but would just \"shit\" all over the place. The physician was called for an Imodium order the first dose was brought to the patient. Planned ignoring was used for a short time and then patient was intermittently asked if she needed anything else. Her subsequent responses were much more pleasant. She was engaged pleasantly to set at tone for future communications. Continue to assess. Patient was medication compliant. She asked sleep medication but said Trazodone was not helpful. She agreed to take a PRN Klonopin. 0054--patient was awake and received Imitrex for headache.    0330--Patient is conveying she is having lucid dreams. Patient said the dreams are very vivid and increasing in frequency. The dreams are \"intense\" and the dreams are waking up patient.

## 2023-07-23 NOTE — GROUP NOTE
Group Therapy Note    Date: 7/23/2023    Group Start Time: 1115  Group End Time: 1200  Group Topic: Education Group - Inpatient    [facility] 2 BH NON ACUTE    Donna Alvarado        Group Therapy Note    Attendees: 8/9    Facilitated group discussion on Common Reactions to Trauma and to identify Stress Risk Factors       Patient's Goal:  Pt/Family able to verbalize concerns and demonstrate effective coping strategies    Notes: Attended group and was receptive to intervention and participated in discussion with cues and encouragement. PT was able to discuss personal risk factors and identify positive ways to manage stressors. PT actively engaged in group. Able to apologize to peer for her disruption in group yesterday. Status After Intervention:  Improved    Participation Level: Active Listener    Participation Quality: Attentive      Speech:  normal      Thought Process/Content: Logical      Affective Functioning: Congruent      Mood:  calm       Level of consciousness:  Alert      Response to Learning: Progressing to goal      Endings: None Reported    Modes of Intervention: Education and Support      Discipline Responsible: Recreational Therapist      Signature:   EFRAÍN Ibrahim

## 2023-07-23 NOTE — GROUP NOTE
Group Therapy Note    Date: 7/23/2023    Group Start Time: 3856  Group End Time: 2992  Group Topic: Recreational    [facility] [address]        Group Therapy Note    Attendees: 10/11    Recreational Therapist facilitated structured leisure skills group to introduce healthy leisure skills as positive way to cope and manage mood. Notes:  Did not attend group despite encouragement  Discipline Responsible: Recreational Therapist      Signature:   EFRAÍN Benavidez

## 2023-07-24 LAB
ALBUMIN SERPL-MCNC: 3.7 G/DL (ref 3.5–5)
ALBUMIN/GLOB SERPL: 1.2 (ref 1.1–2.2)
ALP SERPL-CCNC: 57 U/L (ref 45–117)
ALT SERPL-CCNC: 16 U/L (ref 12–78)
ANION GAP SERPL CALC-SCNC: 7 MMOL/L (ref 5–15)
AST SERPL W P-5'-P-CCNC: 5 U/L (ref 15–37)
BASOPHILS # BLD: 0.1 K/UL (ref 0–0.1)
BASOPHILS NFR BLD: 1 % (ref 0–1)
BILIRUB SERPL-MCNC: 0.3 MG/DL (ref 0.2–1)
BUN SERPL-MCNC: 14 MG/DL (ref 6–20)
BUN/CREAT SERPL: 19 (ref 12–20)
CA-I BLD-MCNC: 9.3 MG/DL (ref 8.5–10.1)
CHLORIDE SERPL-SCNC: 115 MMOL/L (ref 97–108)
CO2 SERPL-SCNC: 23 MMOL/L (ref 21–32)
CREAT SERPL-MCNC: 0.75 MG/DL (ref 0.55–1.02)
DIFFERENTIAL METHOD BLD: NORMAL
EOSINOPHIL # BLD: 0.3 K/UL (ref 0–0.4)
EOSINOPHIL NFR BLD: 3 % (ref 0–7)
ERYTHROCYTE [DISTWIDTH] IN BLOOD BY AUTOMATED COUNT: 14.1 % (ref 11.5–14.5)
GLOBULIN SER CALC-MCNC: 3.2 G/DL (ref 2–4)
GLUCOSE SERPL-MCNC: 105 MG/DL (ref 65–100)
HCT VFR BLD AUTO: 45.6 % (ref 35–47)
HGB BLD-MCNC: 15.3 G/DL (ref 11.5–16)
IMM GRANULOCYTES # BLD AUTO: 0 K/UL (ref 0–0.04)
IMM GRANULOCYTES NFR BLD AUTO: 0 % (ref 0–0.5)
LYMPHOCYTES # BLD: 3.3 K/UL (ref 0.8–3.5)
LYMPHOCYTES NFR BLD: 40 % (ref 12–49)
MCH RBC QN AUTO: 32.6 PG (ref 26–34)
MCHC RBC AUTO-ENTMCNC: 33.6 G/DL (ref 30–36.5)
MCV RBC AUTO: 97.2 FL (ref 80–99)
MONOCYTES # BLD: 0.5 K/UL (ref 0–1)
MONOCYTES NFR BLD: 6 % (ref 5–13)
NEUTS SEG # BLD: 4.1 K/UL (ref 1.8–8)
NEUTS SEG NFR BLD: 50 % (ref 32–75)
NRBC # BLD: 0 K/UL (ref 0–0.01)
NRBC BLD-RTO: 0 PER 100 WBC
PLATELET # BLD AUTO: 234 K/UL (ref 150–400)
PMV BLD AUTO: 10.7 FL (ref 8.9–12.9)
POTASSIUM SERPL-SCNC: 3.8 MMOL/L (ref 3.5–5.1)
PROT SERPL-MCNC: 6.9 G/DL (ref 6.4–8.2)
RBC # BLD AUTO: 4.69 M/UL (ref 3.8–5.2)
SODIUM SERPL-SCNC: 145 MMOL/L (ref 136–145)
WBC # BLD AUTO: 8.3 K/UL (ref 3.6–11)

## 2023-07-24 PROCEDURE — 6370000000 HC RX 637 (ALT 250 FOR IP): Performed by: PSYCHIATRY & NEUROLOGY

## 2023-07-24 PROCEDURE — 80053 COMPREHEN METABOLIC PANEL: CPT

## 2023-07-24 PROCEDURE — 6370000000 HC RX 637 (ALT 250 FOR IP): Performed by: FAMILY MEDICINE

## 2023-07-24 PROCEDURE — 1240000000 HC EMOTIONAL WELLNESS R&B

## 2023-07-24 PROCEDURE — 6360000002 HC RX W HCPCS: Performed by: PSYCHIATRY & NEUROLOGY

## 2023-07-24 PROCEDURE — 36415 COLL VENOUS BLD VENIPUNCTURE: CPT

## 2023-07-24 PROCEDURE — 85025 COMPLETE CBC W/AUTO DIFF WBC: CPT

## 2023-07-24 RX ORDER — ZIPRASIDONE MESYLATE 20 MG/ML
20 INJECTION, POWDER, LYOPHILIZED, FOR SOLUTION INTRAMUSCULAR 2 TIMES DAILY WITH MEALS
Status: DISCONTINUED | OUTPATIENT
Start: 2023-07-24 | End: 2023-07-27 | Stop reason: HOSPADM

## 2023-07-24 RX ORDER — ZIPRASIDONE HYDROCHLORIDE 20 MG/1
20 CAPSULE ORAL 2 TIMES DAILY WITH MEALS
Status: DISCONTINUED | OUTPATIENT
Start: 2023-07-25 | End: 2023-07-27 | Stop reason: HOSPADM

## 2023-07-24 RX ADMIN — LORAZEPAM 1 MG: 2 INJECTION INTRAMUSCULAR; INTRAVENOUS at 11:14

## 2023-07-24 RX ADMIN — DULOXETINE HYDROCHLORIDE 60 MG: 30 CAPSULE, DELAYED RELEASE ORAL at 09:29

## 2023-07-24 RX ADMIN — ACETAMINOPHEN 650 MG: 325 TABLET ORAL at 09:29

## 2023-07-24 RX ADMIN — CIPROFLOXACIN 500 MG: 500 TABLET, FILM COATED ORAL at 09:29

## 2023-07-24 RX ADMIN — GABAPENTIN 200 MG: 100 CAPSULE ORAL at 21:11

## 2023-07-24 RX ADMIN — BUSPIRONE HYDROCHLORIDE 15 MG: 10 TABLET ORAL at 21:11

## 2023-07-24 RX ADMIN — HYDROXYZINE HYDROCHLORIDE 50 MG: 50 TABLET, FILM COATED ORAL at 09:29

## 2023-07-24 RX ADMIN — TOPIRAMATE 200 MG: 100 TABLET, FILM COATED ORAL at 09:28

## 2023-07-24 RX ADMIN — GABAPENTIN 100 MG: 100 CAPSULE ORAL at 09:28

## 2023-07-24 RX ADMIN — TOPIRAMATE 200 MG: 100 TABLET, FILM COATED ORAL at 21:11

## 2023-07-24 RX ADMIN — HALOPERIDOL LACTATE 5 MG: 5 INJECTION, SOLUTION INTRAMUSCULAR at 11:14

## 2023-07-24 RX ADMIN — BUSPIRONE HYDROCHLORIDE 15 MG: 10 TABLET ORAL at 09:28

## 2023-07-24 RX ADMIN — ZIPRASIDONE MESYLATE 20 MG: 20 INJECTION, POWDER, LYOPHILIZED, FOR SOLUTION INTRAMUSCULAR at 18:06

## 2023-07-24 RX ADMIN — ONDANSETRON 4 MG: 4 TABLET, ORALLY DISINTEGRATING ORAL at 10:18

## 2023-07-24 RX ADMIN — ZIPRASIDONE HYDROCHLORIDE 20 MG: 20 CAPSULE ORAL at 09:30

## 2023-07-24 RX ADMIN — CIPROFLOXACIN 500 MG: 500 TABLET, FILM COATED ORAL at 21:12

## 2023-07-24 ASSESSMENT — PAIN SCALES - GENERAL: PAINLEVEL_OUTOF10: 0

## 2023-07-24 NOTE — BH NOTE
DAY SHIFT    Pt up ad eric on unit eating measl in day room interacting with peers. Pt initally salm, cooperative and pleasant with t his writer. Pt  refused vital signs to be taken by [facility], pt agreed to let this writer take her vital signs. Pt takes medications without difficulty. Pt stated she has a headache pt offered Imitrex pt then became verbally aggressive stating \"oh great I have a dumbass who cant read my motherfu**ing chart I allergic to that\" pt then continued to become verbally aggressive and loud, pt redirected and calmed down. Pt labile,  hostile, irritable, and agitated. Pt talking with another pt and get tearful during conversation and stated she was \"spiritually cleansing him\" pt became verbally aggressive, loud, and inappropriate with BHT and could not be redirected, CODE LIT called, pt accepts IM haldol in the left deltoid and IM ativan in the eft deltoid without difficulty. Pt resting quietly in room. Close observations continued to ensure pt safety. 1700: pt offered scheduled PO Geodon opt refused pt informed she was under a order to treat and if she did not take PO medication it will be given in a shot. Pt still refusing medication, case management spoke with pt, pt continued to refuse. This writer offered PO again pt agreed to take it, when asked to see pts mouth pt stated \"fuck you [place] bitch\" pt proceeded to day room and spit medication in trash, this writer went and got medication out of trash then went to pt and asked if she would come to her room for her medication, pt smacks this writer hands and states \"I got something for you bitch\" Code lit called pt given IM Geodon per order to treat.

## 2023-07-24 NOTE — BH NOTE
HEARING DISPOSITION     : Stiven Challenger  : Ms. Serina Ly: 10 Days   AMBER: Any anti-psychotic and/or anti-anxiety   Expires: August 2,2023

## 2023-07-24 NOTE — GROUP NOTE
Group Therapy Note    Date: 7/24/2023    Group Start Time: 1130  Group End Time: 5503  Group Topic: Education Group - Inpatient    [facility] 2 BH NON ACUTE    [facility]        Group Therapy Note    Facilitated discussion focused on defining and recognizing examples of different types of cognitive distortions and how they affect moods and behaviors     Attendees: 8/12      Notes: Pt did not attend.  Code KAILA was called on pt prior to the beginning of group    Discipline Responsible: Recreational Therapist      Signature:  Ame Combs

## 2023-07-24 NOTE — BH NOTE
Behavioral Health Treatment Team Note     Patient goal(s) for today: \"no body listens to me\"  Treatment team focus/goals: continue medication management, group therapy, learn healthy communication skills and provide a safe discharge     Progress note: Pt presented with a liable, manic, irritable affect and \"frustrated\" mood. Pt denied any SI/HI/Avh at the time and was orientated x4. Pts thoughts appeared to be racing, tangential and focused on the staff. Pt stated \"no body listens to shit\" and was hyper focused on the writer not calling her doctor. Writer tried to apologize and explain that she would call today and pt was not receptive. Pt stated that Dr. Elma Prather is the only doctor who knows her and can help her. Pt lacks insight on her needs and has difficulties with communication. Writer reached out to pts doctor and left a VM for the office to call back. Writer is requesting pts current medication lists and some hx on the pt. Writer was given verbal permission to speak with her doctor. An inpatient level of care is needed to further stabilize the pts mood and unpredictable behaviors to others. Writer was given permission to speak with the pts family without a ELE because of pt being placed under a TDO. It is important to gain collateral information in order to provide appropriate and safe treatment goals, to ensure the best quality of care for the pt.     LOS:  4  Expected LOS: TDO until 8.2.23 with [facility]     Insurance info/prescription coverage:  BSBS Medicaid   Date of last family contact:   for Dr. Elma Prather [phone]  Family requesting physician contact today:  No  Discharge plan:  to further stabilize the pt  Guns in the home:  No   Outpatient provider(s):  Dr. Elma Prather     Participating treatment team members: Lester Abernathy, * (assigned SW), Yuval Barahona LMSW

## 2023-07-24 NOTE — GROUP NOTE
Group Therapy Note    Date: 7/24/2023    Group Start Time: 1525  Group End Time: 5857  Group Topic: Recreational    SSR 2 BH NON ACUTE    Jean Claude Calabrese        Group Therapy Note    Facilitated leisure skills group to reinforce positive coping and to manage mood through music, social interaction, group activities and art task     Attendees: 7/11       Notes: Encouraged but did not attend    Discipline Responsible: Recreational Therapist      Signature:  Ame Payne

## 2023-07-24 NOTE — BH NOTE
Patient remains on close observation. Patient has been alert, oriented, cooperative and generally pleasant, laying in bed, unkempt appearance, flat/tired expressions on her face. Patient discussed her abd pain of the day-shift. She discussed her history of pacing kidney stones. She said she feels as if she passed a stone on the day-shift. She rated headache paint as 4/10 but declined pain medication. She said she feels as if she is \"wearing out\" due to the kidney stone situation. She said she did not want other medications due to her vomiting on day-shift. She eventually took her hs medications without any incident. She was given, per her request, Ginger Ale X 2 this shift, Saltine crackers and baked Lays Potato chips. Continue to assess.
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
## 2023-07-25 PROCEDURE — 6370000000 HC RX 637 (ALT 250 FOR IP): Performed by: PSYCHIATRY & NEUROLOGY

## 2023-07-25 PROCEDURE — 6370000000 HC RX 637 (ALT 250 FOR IP): Performed by: FAMILY MEDICINE

## 2023-07-25 PROCEDURE — 1240000000 HC EMOTIONAL WELLNESS R&B

## 2023-07-25 RX ADMIN — GABAPENTIN 200 MG: 100 CAPSULE ORAL at 21:19

## 2023-07-25 RX ADMIN — TOPIRAMATE 200 MG: 100 TABLET, FILM COATED ORAL at 21:19

## 2023-07-25 RX ADMIN — DULOXETINE HYDROCHLORIDE 60 MG: 30 CAPSULE, DELAYED RELEASE ORAL at 09:33

## 2023-07-25 RX ADMIN — CLONAZEPAM 0.5 MG: 0.5 TABLET ORAL at 22:34

## 2023-07-25 RX ADMIN — CIPROFLOXACIN 500 MG: 500 TABLET, FILM COATED ORAL at 09:32

## 2023-07-25 RX ADMIN — ZIPRASIDONE HYDROCHLORIDE 20 MG: 20 CAPSULE ORAL at 17:16

## 2023-07-25 RX ADMIN — GABAPENTIN 100 MG: 100 CAPSULE ORAL at 09:33

## 2023-07-25 RX ADMIN — HYDROXYZINE HYDROCHLORIDE 50 MG: 50 TABLET, FILM COATED ORAL at 18:26

## 2023-07-25 RX ADMIN — TOPIRAMATE 200 MG: 100 TABLET, FILM COATED ORAL at 09:33

## 2023-07-25 RX ADMIN — ZIPRASIDONE HYDROCHLORIDE 20 MG: 20 CAPSULE ORAL at 09:33

## 2023-07-25 RX ADMIN — BUSPIRONE HYDROCHLORIDE 15 MG: 10 TABLET ORAL at 09:32

## 2023-07-25 RX ADMIN — CIPROFLOXACIN 500 MG: 500 TABLET, FILM COATED ORAL at 21:19

## 2023-07-25 RX ADMIN — BUSPIRONE HYDROCHLORIDE 15 MG: 10 TABLET ORAL at 21:19

## 2023-07-25 NOTE — GROUP NOTE
Group Therapy Note    Date: 7/25/2023    Group Start Time: 9595  Group End Time: 1600  Group Topic: Education Group - Inpatient    SSR 2  NON ACUTE    Tom Pollock        Group Therapy Note    Facilitated group focused on introducing information on learning to complete a thought log to help challenge negative thoughts and develop a more accurate view of a situation to improve mood and behavior     Attendees: 6/11       Patient's Goal:  Pt will demonstrate positive and appropriate communication and boudaries with staff and peers     Notes:  Accepted handouts. Receptive to information discussed but did not engage in discussion    Status After Intervention:  Improved    Participation Level:  Active Listener    Participation Quality: Appropriate      Speech:  normal      Thought Process/Content: Logical      Affective Functioning: Congruent      Mood:  Calm      Level of consciousness:  Alert      Response to Learning: Progressing to goal      Endings: None Reported    Modes of Intervention: Education and Support      Discipline Responsible: Recreational Therapist      Signature:  Ame Monae
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
## 2023-07-25 NOTE — GROUP NOTE
Group Therapy Note    Date: 7/25/2023    Group Start Time: 1600  Group End Time: 1640  Group Topic: Recreational    SSR 2 [facility]        Group Therapy Note    Facilitated leisure skills group to reinforce positive coping and to manage mood through music, social interaction, group activities and art task    Attendees: 7/11       Patient's Goal:  Patient will demonstrate positive and appropriate communication and boundaries with staff and peers    Notes:  Pt was receptive to listening to music and songs she selected while working on leisure task. Interacted with peers and staff. Left group. Did not return    Status After Intervention:  Improved    Participation Level:  Active Listener and Interactive    Participation Quality: Appropriate and Attentive      Speech:  normal      Thought Process/Content: Logical      Affective Functioning: Congruent      Mood:  Calm      Level of consciousness:  Attentive      Response to Learning: Progressing to goal      Endings: None Reported    Modes of Intervention: Socialization and Activity      Discipline Responsible: Recreational Therapist      Signature:  Ame Lucio

## 2023-07-25 NOTE — BH NOTE
Patient remains on close observation. Patient has been alert, oriented, cooperative and pleasant. Patient has been sitting in the dayroom this shift. Patient complained of stomach and received some Ginger Ale. Patient had two patient's to make complaints about her behavior and things she said during the day-shift. Flat affect. She did not voice any depression, anxiety, SI or HI. Patient went to bed early. She was medication compliant. Continue to assess.
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
## 2023-07-26 PROCEDURE — 6370000000 HC RX 637 (ALT 250 FOR IP): Performed by: FAMILY MEDICINE

## 2023-07-26 PROCEDURE — 99284 EMERGENCY DEPT VISIT MOD MDM: CPT

## 2023-07-26 PROCEDURE — 6370000000 HC RX 637 (ALT 250 FOR IP): Performed by: PSYCHIATRY & NEUROLOGY

## 2023-07-26 PROCEDURE — 1240000000 HC EMOTIONAL WELLNESS R&B

## 2023-07-26 PROCEDURE — 87086 URINE CULTURE/COLONY COUNT: CPT

## 2023-07-26 RX ORDER — RIZATRIPTAN BENZOATE 10 MG/1
10 TABLET, ORALLY DISINTEGRATING ORAL
Status: COMPLETED | OUTPATIENT
Start: 2023-07-26 | End: 2023-07-26

## 2023-07-26 RX ADMIN — TOPIRAMATE 200 MG: 100 TABLET, FILM COATED ORAL at 09:06

## 2023-07-26 RX ADMIN — CIPROFLOXACIN 500 MG: 500 TABLET, FILM COATED ORAL at 20:45

## 2023-07-26 RX ADMIN — RIZATRIPTAN BENZOATE 10 MG: 10 TABLET, ORALLY DISINTEGRATING ORAL at 20:43

## 2023-07-26 RX ADMIN — BUSPIRONE HYDROCHLORIDE 15 MG: 10 TABLET ORAL at 09:06

## 2023-07-26 RX ADMIN — HYDROXYZINE HYDROCHLORIDE 50 MG: 50 TABLET, FILM COATED ORAL at 09:17

## 2023-07-26 RX ADMIN — DULOXETINE HYDROCHLORIDE 60 MG: 30 CAPSULE, DELAYED RELEASE ORAL at 09:06

## 2023-07-26 RX ADMIN — TRAZODONE HYDROCHLORIDE 50 MG: 50 TABLET ORAL at 23:36

## 2023-07-26 RX ADMIN — HYDROXYZINE HYDROCHLORIDE 50 MG: 50 TABLET, FILM COATED ORAL at 23:36

## 2023-07-26 RX ADMIN — ZIPRASIDONE HYDROCHLORIDE 20 MG: 20 CAPSULE ORAL at 16:55

## 2023-07-26 RX ADMIN — GABAPENTIN 200 MG: 100 CAPSULE ORAL at 20:45

## 2023-07-26 RX ADMIN — ZIPRASIDONE HYDROCHLORIDE 20 MG: 20 CAPSULE ORAL at 09:06

## 2023-07-26 RX ADMIN — CIPROFLOXACIN 500 MG: 500 TABLET, FILM COATED ORAL at 09:07

## 2023-07-26 RX ADMIN — GABAPENTIN 100 MG: 100 CAPSULE ORAL at 09:06

## 2023-07-26 RX ADMIN — TOPIRAMATE 200 MG: 100 TABLET, FILM COATED ORAL at 20:45

## 2023-07-26 RX ADMIN — BUSPIRONE HYDROCHLORIDE 15 MG: 10 TABLET ORAL at 20:44

## 2023-07-26 ASSESSMENT — PAIN DESCRIPTION - LOCATION
LOCATION: HEAD
LOCATION: HEAD

## 2023-07-26 ASSESSMENT — PAIN SCALES - GENERAL
PAINLEVEL_OUTOF10: 8
PAINLEVEL_OUTOF10: 2

## 2023-07-26 ASSESSMENT — PAIN DESCRIPTION - DESCRIPTORS
DESCRIPTORS: ACHING
DESCRIPTORS: ACHING

## 2023-07-26 ASSESSMENT — PAIN - FUNCTIONAL ASSESSMENT
PAIN_FUNCTIONAL_ASSESSMENT: ACTIVITIES ARE NOT PREVENTED
PAIN_FUNCTIONAL_ASSESSMENT: ACTIVITIES ARE NOT PREVENTED

## 2023-07-26 ASSESSMENT — PAIN DESCRIPTION - PAIN TYPE: TYPE: CHRONIC PAIN
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
## 2023-07-26 NOTE — CONSULTS
FIO2 in the last 72 hours. 24 Hour Results:  No results found for this or any previous visit (from the past 24 hour(s)). Physical Exam:  Physical Exam  HENT:      Head: Normocephalic. Nose: Nose normal.   Cardiovascular:      Pulses: Normal pulses. Pulmonary:      Effort: Pulmonary effort is normal.   Musculoskeletal:      Cervical back: Normal range of motion. Skin:     General: Skin is warm. Capillary Refill: Capillary refill takes less than 2 seconds. Neurological:      Mental Status: She is alert. Assessment:         Plan:     1. Urinary retention  PVR is 269 cc which is not critical and do not require immediate intervention  Patient would benefit from alpha blockers such as  tamsuloisn, but has allergy to the medication. Silodoisin is not available at this facility to substitute    -will discuss self cath with the patient if she whishes  -if post void residual is above 300 cc will consider placing chavarria catheter  - patent will need cystoscopy as outpatient and silodosin will be prescribed for incomplete bladder emptying.  - will bladder scan patient x2 per shift to monitor her residual.  2. Kidney stones  Upon review of the CT scan the stones are about 2 mm  No interventions necessary    Signed By: DEREK Villanueva - NP     July 26, 2023      Please note that portions of this note were completed with Dragon dictation, the computer voice recognition software. Quite often unanticipated grammatical, syntax, homophones, and other interpretive errors are inadvertently transcribed by the computer software. Please disregard these errors and any other errors that may have escaped final proofreading. Thank you.

## 2023-07-26 NOTE — BH NOTE
1110- attempted to call Dr. Mindy Hong with urology at this time due to pt complaints of difficulty urinating. On 7/23, consult was placed by Dr. Selina Morales to Dr. Mindy Hong and no note is posted pertaining to seeing the pt. Dr. Aliya Sullivan is full and writer unable to leave . Perfect serve sent by  to Dr. Mindy Hong at [phone].    [phone]- Urine culture ordered by [facility] NP who works with Dr. Mindy Hong.    Rogelio Yoo resulted at 269 mL at 1450. Rae aware and urine specimen sent to lab. NP states she will input orders for the pt.
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
## 2023-07-26 NOTE — BH NOTE
Patient remains on close observation. Patient has been alert, oriented, cooperative and pleasant. Patient was medication compliant. Patient awakened and requested sleep medication. She declined Trazodone but took Klonopin. She then stayed up for about 1.5 hours walking the hallway or sitting in the hallway with two peers. Patient has not voiced any depression, anxiety, SI or HI. Continue to assess.

## 2023-07-27 VITALS
OXYGEN SATURATION: 99 % | SYSTOLIC BLOOD PRESSURE: 122 MMHG | HEART RATE: 65 BPM | BODY MASS INDEX: 28.89 KG/M2 | RESPIRATION RATE: 17 BRPM | HEIGHT: 61 IN | WEIGHT: 153 LBS | DIASTOLIC BLOOD PRESSURE: 76 MMHG | TEMPERATURE: 97.5 F

## 2023-07-27 LAB
BACTERIA SPEC CULT: NORMAL
Lab: NORMAL

## 2023-07-27 PROCEDURE — 6370000000 HC RX 637 (ALT 250 FOR IP): Performed by: PSYCHIATRY & NEUROLOGY

## 2023-07-27 PROCEDURE — 6370000000 HC RX 637 (ALT 250 FOR IP): Performed by: FAMILY MEDICINE

## 2023-07-27 RX ORDER — CLONAZEPAM 0.5 MG/1
0.5 TABLET ORAL DAILY PRN
Qty: 10 TABLET | Refills: 0 | Status: SHIPPED | OUTPATIENT
Start: 2023-07-27 | End: 2023-08-06

## 2023-07-27 RX ORDER — ZIPRASIDONE HYDROCHLORIDE 20 MG/1
20 CAPSULE ORAL 2 TIMES DAILY WITH MEALS
Qty: 60 CAPSULE | Refills: 3 | Status: SHIPPED | OUTPATIENT
Start: 2023-07-27

## 2023-07-27 RX ORDER — BUSPIRONE HYDROCHLORIDE 15 MG/1
15 TABLET ORAL 2 TIMES DAILY
Qty: 60 TABLET | Refills: 2 | Status: SHIPPED | OUTPATIENT
Start: 2023-07-27

## 2023-07-27 RX ORDER — GABAPENTIN 100 MG/1
100 CAPSULE ORAL EVERY MORNING
Qty: 30 CAPSULE | Refills: 0 | Status: SHIPPED | OUTPATIENT
Start: 2023-07-28 | End: 2023-08-27

## 2023-07-27 RX ORDER — HYDROXYZINE 50 MG/1
50 TABLET, FILM COATED ORAL 3 TIMES DAILY PRN
Qty: 10 TABLET | Refills: 2 | Status: SHIPPED | OUTPATIENT
Start: 2023-07-27

## 2023-07-27 RX ORDER — GABAPENTIN 100 MG/1
200 CAPSULE ORAL NIGHTLY
Qty: 30 CAPSULE | Refills: 0 | Status: SHIPPED | OUTPATIENT
Start: 2023-07-27 | End: 2023-08-26

## 2023-07-27 RX ORDER — TRAZODONE HYDROCHLORIDE 50 MG/1
50 TABLET ORAL NIGHTLY PRN
Qty: 30 TABLET | Refills: 2 | Status: SHIPPED | OUTPATIENT
Start: 2023-07-27

## 2023-07-27 RX ORDER — DULOXETIN HYDROCHLORIDE 60 MG/1
60 CAPSULE, DELAYED RELEASE ORAL DAILY
Qty: 30 CAPSULE | Refills: 3 | Status: SHIPPED | OUTPATIENT
Start: 2023-07-28

## 2023-07-27 RX ORDER — CIPROFLOXACIN 500 MG/1
500 TABLET, FILM COATED ORAL EVERY 12 HOURS SCHEDULED
Qty: 3 TABLET | Refills: 0 | Status: SHIPPED | OUTPATIENT
Start: 2023-07-27 | End: 2023-07-29

## 2023-07-27 RX ORDER — ERGOCALCIFEROL 1.25 MG/1
50000 CAPSULE ORAL WEEKLY
Qty: 5 CAPSULE | Refills: 0 | Status: SHIPPED | OUTPATIENT
Start: 2023-07-29

## 2023-07-27 RX ADMIN — CIPROFLOXACIN 500 MG: 500 TABLET, FILM COATED ORAL at 08:37

## 2023-07-27 RX ADMIN — BUSPIRONE HYDROCHLORIDE 15 MG: 10 TABLET ORAL at 08:36

## 2023-07-27 RX ADMIN — HYDROXYZINE HYDROCHLORIDE 50 MG: 50 TABLET, FILM COATED ORAL at 08:36

## 2023-07-27 RX ADMIN — TOPIRAMATE 200 MG: 100 TABLET, FILM COATED ORAL at 08:37

## 2023-07-27 RX ADMIN — ZIPRASIDONE HYDROCHLORIDE 20 MG: 20 CAPSULE ORAL at 08:37

## 2023-07-27 RX ADMIN — DULOXETINE HYDROCHLORIDE 60 MG: 30 CAPSULE, DELAYED RELEASE ORAL at 08:36

## 2023-07-27 RX ADMIN — GABAPENTIN 100 MG: 100 CAPSULE ORAL at 08:37

## 2023-07-27 ASSESSMENT — PAIN SCALES - GENERAL: PAINLEVEL_OUTOF10: 0
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
## 2023-07-27 NOTE — BH NOTE
PSYCHIATRIC DISCHARGE SUMMARY         IDENTIFICATION:    Patient Name  Chanelle Armas   Date of Birth 1970   [facility] [id number]   Medical Record Number  [id number]      Age  46 y.o. PCP No primary care provider on file.    Admit date:  2023    Discharge date: 2023   Room Number  242/02  @ [facility]   Date of Service  2023            TYPE OF DISCHARGE: REGULAR               CONDITION AT DISCHARGE: Stable       PROVISIONAL & DISCHARGE DIAGNOSES:      Problem List Items Addressed This Visit    None  Visit Diagnoses       Delusional ideas ([address])    -  Primary    Relevant Medications    clonazePAM (KLONOPIN) 0.5 MG tablet    gabapentin (NEURONTIN) 100 MG capsule (Start on 2023)    gabapentin (NEURONTIN) 100 MG capsule    Paranoid ideation (HCC)        Psychiatric disturbance        Medical clearance for psychiatric admission                  CC & HISTORY OF PRESENT ILLNESS:         SOCIAL HISTORY:    Social History     Socioeconomic History    Marital status:      Spouse name: Not on file    Number of children: Not on file    Years of education: Not on file    Highest education level: Not on file   Occupational History    Not on file   Tobacco Use    Smoking status: Former     Packs/day: 1.00     Types: Cigarettes     Quit date: 10/14/2020     Years since quittin.7    Smokeless tobacco: Never   Substance and Sexual Activity    Alcohol use: No    Drug use: Yes     Frequency: 7.0 times per week     Types: Marijuana [facility])    Sexual activity: Not on file   Other Topics Concern    Not on file   Social History Narrative    Not on file     Social Determinants of Health     Financial Resource Strain: Not on file   Food Insecurity: Not on file   Transportation Needs: Not on file   Physical Activity: Not on file   Stress: Not on file   Social Connections: Not on file   Intimate Partner Violence: Not on file   Housing Stability: Not on file      FAMILY HISTORY:   No family history on

## 2023-07-27 NOTE — BH NOTE
DISCHARGE SUMMARY    NAME:Barbara Todd  : 1970  MRN: [id number]    The patient Bhavin Payton exhibits the ability to control behavior in a less restrictive environment. Patient's level of functioning is improving. No assaultive/destructive behavior has been observed for the past 24 hours. No suicidal/homicidal threat or behavior has been observed for the past 24 hours. There is no evidence of serious medication side effects. Patient has not been in physical or protective restraints for at least the past 24 hours. If weapons involved, how are they secured? N/a    Is patient aware of and in agreement with discharge plan? yes    Arrangements for medication:  Prescriptions will be sent to [facility] of discharge instructions to provider?:  yes    Arrangements for transportation home:  Pt will drive herself to [facility] all follow up appointments as scheduled, continue to take prescribed medications per physician instructions.   Mental health crisis number:  720 or your local mental health crisis line number at [facility] WARM LINE      [phone] ([phone])      M-F: 9am to 9pm      Sat & Sun: 5pm - 9pm  National suicide prevention lines:                             9-105-DVRYBJI ([phone])       0-278-123-TALK ([phone])    Crisis Text Line:  Text HOME to 893482

## 2023-07-27 NOTE — BH NOTE
Pt ordered to be discharged this morning by Dr. Pat Mcintyre. Pt denies SI/HI/AVH at time of discharge. Pt transportation provided by herself as she drove herself to the hospital. Pt denies depression and anxiety. Pt signed and accounted for all belongings in safe and storage. Pt home meds picked up from pharmacy. Writer reviewed discharge instructions with pt and pt verbalized understandng. Pt rx sent to [facility] pharmacy. Pt agrees to safety plan. No pain or physical complaints. Pt escorted off unit at 1155 to personal vehicle in hospital parking lot.

## 2023-07-27 NOTE — BH NOTE
[address] [facility] Progress Note:    Nurse Supervisor Keven Bose was contacted concerning obtaining a bladder scan as ordered. She instructed this nurse to contact the 2E Charge Nurse for assistance. 2E Charge was available and willing assist as soon as possible due to a medical rapid response code called on unit.

## 2023-07-27 NOTE — BH NOTE
Shift Note:    Ms. Kaila Tolliver was alert and oriented times 4 at the onset of the shift in the dayroom watching TV and associating well with her peers. She complained of a headache and requested rizatriptan with her night medication. She accepted her medication as prescribed. She completed a shower and hygiene. Currently she is walking the unit talking with staff and peers. ICU charge nurse completed bladder scan with result of 10 mL residual.  Staff will continue to monitor on every 15 minute checks.

## (undated) DEVICE — KENDALL DL ECG CABLE AND LEAD WIRE SYSTEM, 3-LEAD, SINGLE PATIENT USE: Brand: KENDALL

## (undated) DEVICE — SOL IRRIGATION INJ NACL 0.9% 500ML BTL

## (undated) DEVICE — STERILE POLYISOPRENE POWDER-FREE SURGICAL GLOVES: Brand: PROTEXIS

## (undated) DEVICE — CONTINU-FLO SOLUTION SET, 2 INJECTION SITES, MALE LUER LOCK ADAPTER WITH RETRACTABLE COLLAR, LARGE BORE STOPCOCK WITH ROTATING MALE LUER LOCK EXTENSION SET, 2 INJECTION SITES, MALE LUER LOCK ADAPTER WITH RETRACTABLE COLLAR: Brand: INTERLINK/CONTINU-FLO

## (undated) DEVICE — BLADE MYR 45DEG OFFSET S STL LANC TIP NAR SHFT DISP BEAV

## (undated) DEVICE — TUBING, SUCTION, 1/4" X 10', STRAIGHT: Brand: MEDLINE

## (undated) DEVICE — SYR 5ML 1/5 GRAD LL NSAF LF --

## (undated) DEVICE — MEDI-VAC NON-CONDUCTIVE SUCTION TUBING: Brand: CARDINAL HEALTH

## (undated) DEVICE — 3M™ TEGADERM™ TRANSPARENT FILM DRESSING FRAME STYLE, 1624W, 2-3/8 IN X 2-3/4 IN (6 CM X 7 CM), 100/CT 4CT/CASE: Brand: 3M™ TEGADERM™

## (undated) DEVICE — BOWL UTIL GRAD 32OZ STRL --

## (undated) DEVICE — SOLUTION LACTATED RINGERS INJECTION USP

## (undated) DEVICE — TOWEL SURG W17XL27IN STD BLU COT NONFENESTRATED PREWASHED

## (undated) DEVICE — KIT,1200CC CANISTER,3/16"X6' TUBING: Brand: MEDLINE INDUSTRIES, INC.

## (undated) DEVICE — 1200 GUARD II KIT W/5MM TUBE W/O VAC TUBE: Brand: GUARDIAN